# Patient Record
Sex: FEMALE | Race: WHITE | Employment: UNEMPLOYED | URBAN - METROPOLITAN AREA
[De-identification: names, ages, dates, MRNs, and addresses within clinical notes are randomized per-mention and may not be internally consistent; named-entity substitution may affect disease eponyms.]

---

## 2017-01-03 ENCOUNTER — OFFICE VISIT (OUTPATIENT)
Dept: FAMILY MEDICINE CLINIC | Age: 42
End: 2017-01-03

## 2017-01-03 VITALS
HEIGHT: 60 IN | OXYGEN SATURATION: 98 % | TEMPERATURE: 96.8 F | DIASTOLIC BLOOD PRESSURE: 90 MMHG | BODY MASS INDEX: 38.09 KG/M2 | SYSTOLIC BLOOD PRESSURE: 137 MMHG | WEIGHT: 194 LBS | HEART RATE: 105 BPM | RESPIRATION RATE: 16 BRPM

## 2017-01-03 DIAGNOSIS — G89.29 CHRONIC PAIN OF RIGHT KNEE: Primary | ICD-10-CM

## 2017-01-03 DIAGNOSIS — G89.29 CHRONIC PAIN OF LEFT KNEE: ICD-10-CM

## 2017-01-03 DIAGNOSIS — Z98.51 HISTORY OF BILATERAL TUBAL LIGATION: ICD-10-CM

## 2017-01-03 DIAGNOSIS — M25.561 CHRONIC PAIN OF RIGHT KNEE: Primary | ICD-10-CM

## 2017-01-03 DIAGNOSIS — M25.562 CHRONIC PAIN OF LEFT KNEE: ICD-10-CM

## 2017-01-03 RX ORDER — NAPROXEN 500 MG/1
500 TABLET ORAL
Qty: 30 TAB | Refills: 2 | Status: SHIPPED | OUTPATIENT
Start: 2017-01-03 | End: 2017-02-02 | Stop reason: ALTCHOICE

## 2017-01-03 NOTE — PROGRESS NOTES
Subjective:     Carito Wolf is a 37 y.o. female     3rd visit with this patient. Last one was over a year ago. She didn't do lab work or follow up until now. She have just been released from incarceration 2 weeks ago. LMP 12/15/2016    seen for evaluation and treatment of bilateral knee pain. This is evaluated as a personal injury. Right knee history of Menical and ACL surgery in 2013. Left knee pain went to ED Dec 2015 had X-ray and was normal then. Left knee slipped and landed on her knee while incarcerated in 04/2016. The pain is located medially. Symptoms improve with rest. Symptoms worsen with activity, running. The knee has not given out or felt unstable. The patient can bend and straighten the knee fully. Treatment to date has included NSAID's, without significant relief. Patients activity level: infrequently active in sports    Pertinent items are noted in HPI. Patient Active Problem List    Diagnosis Date Noted    History of bilateral tubal ligation 01/03/2017    [facility] 12/17/2015    Multiple personality disorder 12/17/2015    Schizophrenia ([facility] 12/17/2015    Anxiety and depression 11/25/2015    Essential hypertension 11/25/2015     Current Outpatient Prescriptions   Medication Sig Dispense Refill    naproxen (NAPROSYN) 500 mg tablet Take 1 Tab by mouth two (2) times daily as needed. 30 Tab 2    metoprolol succinate (TOPROL-XL) 100 mg XL tablet Take 1 Tab by mouth daily. 90 Tab 1    ALPRAZolam (XANAX) 1 mg tablet Take 1 Tab by mouth daily as needed for Anxiety. 30 Tab 0    HYDROcodone-acetaminophen (NORCO) 5-325 mg per tablet Take 1 Tab by mouth nightly as needed for Pain. Max Daily Amount: 1 Tab.  30 Tab 0     No Known Allergies  Past Surgical History   Procedure Laterality Date    Hx gyn       c section x3    Hx tubal ligation      Hx acl reconstruction Right         Objective:      General :   alert, cooperative, no distress, appears stated age   Gait: Normal. The patient can bear weight on the injured extremity. Left Lower Extremity  Hip Palpation:  no tenderness over the greater  trochanter   Hip ROM: 100% of normal    Knee Effusion:  0-1+   Ecchymosis:  none   Knee ROM:  0 to 130 degrees without subpatellar   crepitance. Patella:  Patella does track normally. Patellar apprehension test: negative  Patellar compression test: negative   Tenderness: lateral joint line   Stability:  Lachman's test: negative  Posterior drawer: negative  Medial collateral ligament: negative  Lateral collateral ligament: negative     Virgie's Test:  positive with no joint line tenderness   Sensation:   intact to light touch   Pulses: normal DP and PT pulses       Assessment:     Treatment options discussed including Medication options discussed and recommended. , Physical Therapy discussed and ordered, Activity modification discussed and recommended. Rod Stevens was seen today for knee pain. Diagnoses and all orders for this visit:    Chronic pain of right knee  -     MRI KNEE RT WO CONT; Future  -     naproxen (NAPROSYN) 500 mg tablet; Take 1 Tab by mouth two (2) times daily as needed. Chronic pain of left knee  -     XR KNEE LT MAX 2 VWS; Future  -     naproxen (NAPROSYN) 500 mg tablet; Take 1 Tab by mouth two (2) times daily as needed. History of bilateral tubal ligation      Follow-up Disposition:  Return in about 3 weeks (around 1/24/2017) for knees MRI and X-ray.       Mitzi Tolbert MD  1/9/2017

## 2017-01-03 NOTE — PROGRESS NOTES
Chief Complaint   Patient presents with    Knee Pain     and leg pain   has been in group home just got out recently and started back on meds.

## 2017-01-03 NOTE — MR AVS SNAPSHOT
Visit Information Date & Time Provider Department Dept. Phone Encounter #  
 1/3/2017 12:15 PM Lucila Tsai MD [facility] at [address] [place] [address] [id number] Follow-up Instructions Return in about 3 weeks (around 1/24/2017) for knees MRI and X-ray. Upcoming Health Maintenance Date Due DTaP/Tdap/Td series (1 - Tdap) 4/18/1997 PAP AKA CERVICAL CYTOLOGY 4/18/1997 INFLUENZA AGE 9 TO ADULT 8/1/2016 Allergies as of 1/3/2017  Review Complete On: 1/3/2017 By: Lucila Tsai MD  
 No Known Allergies Current Immunizations  Never Reviewed No immunizations on file. Not reviewed this visit You Were Diagnosed With   
  
 Codes Comments Chronic pain of right knee    -  Primary ICD-10-CM: M25.561, G37.36 ICD-9-CM: 719.46, 338.29 Chronic pain of left knee     ICD-10-CM: M25.562, G89.29 ICD-9-CM: 719.46, 338.29 Vitals BP Pulse Temp Resp Height(growth percentile) Weight(growth percentile) 137/90 (!) 105 96.8 °F (36 °C) (Oral) 16 5' (1.524 m) 194 lb (88 kg) LMP SpO2 BMI OB Status Smoking Status 12/15/2016 98% 37.89 kg/m2 Having regular periods Former Smoker Vitals History BMI and BSA Data Body Mass Index Body Surface Area  
 37.89 kg/m 2 1.93 m 2 Preferred Pharmacy [facility] Phone [facility] [address] [place] [address] [phone] Your Updated Medication List  
  
   
This list is accurate as of: 1/3/17 12:46 PM.  Always use your most recent med list.  
  
  
  
  
 ALPRAZolam 1 mg tablet Commonly known as:  Kaleta Yarelis Take 1 Tab by mouth daily as needed for Anxiety. HYDROcodone-acetaminophen 5-325 mg per tablet Commonly known as:  Eva Parisian Take 1 Tab by mouth nightly as needed for Pain. Max Daily Amount: 1 Tab. metoprolol succinate 100 mg tablet Commonly known as:  TOPROL-XL Take 1 Tab by mouth daily. naproxen 500 mg tablet Commonly known as:  NAPROSYN Take 1 Tab by mouth two (2) times daily as needed. Prescriptions Sent to Pharmacy Refills  
 naproxen (NAPROSYN) 500 mg tablet 2 Sig: Take 1 Tab by mouth two (2) times daily as needed. Class: Normal  
 Pharmacy: [address] [place] [address], [address] [place] [address] #: [phone] Route: Oral  
  
Follow-up Instructions Return in about 3 weeks (around 1/24/2017) for knees MRI and X-ray. To-Do List   
 01/03/2017 Imaging:  MRI KNEE RT WO CONT   
  
 01/03/2017 Imaging:  XR KNEE LT MAX 2 VWS Referral Information Referral ID Referred By Referred To  
  
 [id number] Isabella Hanks Not Available Visits Status Start Date End Date 1 New Request 1/3/17 1/3/18 If your referral has a status of pending review or denied, additional information will be sent to support the outcome of this decision. Introducing [facility]! Elsie Jean introduces ScaleIO patient portal. Now you can access parts of your medical record, email your doctor's office, and request medication refills online. 1. In your internet browser, go to [email or web address] 2. Click on the First Time User? Click Here link in the Sign In box. You will see the New Member Sign Up page. 3. Enter your ScaleIO Access Code exactly as it appears below. You will not need to use this code after youve completed the sign-up process. If you do not sign up before the expiration date, you must request a new code. · ScaleIO Access Code: [email or web address] Expires: 4/3/2017 12:46 PM 
 
4. Enter the last four digits of your Social Security Number (xxxx) and Date of Birth (mm/dd/yyyy) as indicated and click Submit. You will be taken to the next sign-up page. 5. Create a ScaleIO ID. This will be your ScaleIO login ID and cannot be changed, so think of one that is secure and easy to remember. 6. Create a Scandit password. You can change your password at any time. 7. Enter your Password Reset Question and Answer. This can be used at a later time if you forget your password. 8. Enter your e-mail address. You will receive e-mail notification when new information is available in [address] 9. Click Sign Up. You can now view and download portions of your medical record. 10. Click the Download Summary menu link to download a portable copy of your medical information. If you have questions, please visit the Frequently Asked Questions section of the Scandit website. Remember, Scandit is NOT to be used for urgent needs. For medical emergencies, dial 911. Now available from your iPhone and Android! Please provide this summary of care documentation to your next provider. Your primary care clinician is listed as Phys Other. If you have any questions after today's visit, please call [phone].

## 2017-01-18 ENCOUNTER — HOSPITAL ENCOUNTER (OUTPATIENT)
Dept: MRI IMAGING | Age: 42
Discharge: HOME | End: 2017-01-18
Attending: FAMILY MEDICINE
Payer: MEDICAID

## 2017-01-18 DIAGNOSIS — M25.561 CHRONIC PAIN OF RIGHT KNEE: ICD-10-CM

## 2017-01-18 DIAGNOSIS — G89.29 CHRONIC PAIN OF RIGHT KNEE: ICD-10-CM

## 2017-01-18 PROCEDURE — 73721 MRI JNT OF LWR EXTRE W/O DYE: CPT

## 2017-01-25 ENCOUNTER — TELEPHONE (OUTPATIENT)
Dept: FAMILY MEDICINE CLINIC | Age: 42
End: 2017-01-25

## 2017-01-25 DIAGNOSIS — G89.29 CHRONIC PAIN OF RIGHT KNEE: Primary | ICD-10-CM

## 2017-01-25 DIAGNOSIS — M25.561 CHRONIC PAIN OF RIGHT KNEE: Primary | ICD-10-CM

## 2017-02-02 ENCOUNTER — OFFICE VISIT (OUTPATIENT)
Dept: FAMILY MEDICINE CLINIC | Age: 42
End: 2017-02-02

## 2017-02-02 ENCOUNTER — HOSPITAL ENCOUNTER (OUTPATIENT)
Dept: LAB | Age: 42
Discharge: HOME | End: 2017-02-02
Payer: MEDICAID

## 2017-02-02 VITALS
TEMPERATURE: 96.6 F | HEART RATE: 76 BPM | SYSTOLIC BLOOD PRESSURE: 124 MMHG | OXYGEN SATURATION: 98 % | DIASTOLIC BLOOD PRESSURE: 96 MMHG | RESPIRATION RATE: 16 BRPM | HEIGHT: 60 IN | WEIGHT: 187 LBS | BODY MASS INDEX: 36.71 KG/M2

## 2017-02-02 DIAGNOSIS — F31.81 BIPOLAR 2 DISORDER (HCC): ICD-10-CM

## 2017-02-02 DIAGNOSIS — I10 ESSENTIAL HYPERTENSION: ICD-10-CM

## 2017-02-02 DIAGNOSIS — F41.9 ANXIETY AND DEPRESSION: ICD-10-CM

## 2017-02-02 DIAGNOSIS — F44.81 MULTIPLE PERSONALITY DISORDER (HCC): ICD-10-CM

## 2017-02-02 DIAGNOSIS — Z79.899 ENCOUNTER FOR LONG-TERM (CURRENT) USE OF MEDICATIONS: ICD-10-CM

## 2017-02-02 DIAGNOSIS — F32.A ANXIETY AND DEPRESSION: ICD-10-CM

## 2017-02-02 DIAGNOSIS — Z12.39 BREAST CANCER SCREENING: ICD-10-CM

## 2017-02-02 DIAGNOSIS — F20.89 OTHER SCHIZOPHRENIA (HCC): ICD-10-CM

## 2017-02-02 DIAGNOSIS — Z20.2 STD EXPOSURE: Primary | ICD-10-CM

## 2017-02-02 DIAGNOSIS — Z12.4 ENCOUNTER FOR SCREENING FOR CERVICAL CANCER: ICD-10-CM

## 2017-02-02 PROCEDURE — 88142 CYTOPATH C/V THIN LAYER: CPT | Performed by: FAMILY MEDICINE

## 2017-02-02 PROCEDURE — 87624 HPV HI-RISK TYP POOLED RSLT: CPT | Performed by: FAMILY MEDICINE

## 2017-02-02 RX ORDER — BUSPIRONE HYDROCHLORIDE 15 MG/1
15 TABLET ORAL
Qty: 60 TAB | Refills: 0 | Status: SHIPPED | OUTPATIENT
Start: 2017-02-02

## 2017-02-02 RX ORDER — TRAZODONE HYDROCHLORIDE 100 MG/1
100 TABLET ORAL
Qty: 30 TAB | Refills: 1 | Status: SHIPPED | OUTPATIENT
Start: 2017-02-02 | End: 2021-12-28 | Stop reason: SDUPTHER

## 2017-02-02 RX ORDER — LISINOPRIL 20 MG/1
20 TABLET ORAL DAILY
Qty: 30 TAB | Refills: 2 | Status: SHIPPED | OUTPATIENT
Start: 2017-02-02 | End: 2017-02-13 | Stop reason: SDUPTHER

## 2017-02-02 RX ORDER — ALPRAZOLAM 1 MG/1
1 TABLET ORAL
Qty: 30 TAB | Refills: 0 | Status: CANCELLED | OUTPATIENT
Start: 2017-02-02

## 2017-02-02 NOTE — PROGRESS NOTES
Patient Identification  Ciro Mcdonough is a 37 y.o. female. Chief Complaint   Medication Refill and Exposure to STD (wants to be tested for STD)    Denies abnormal vaginal discharge, bleeding or significant pelvic pain or fever. No UTI symptoms. Denies history of known exposure to STD. However she was recently incarcerated and have a new sexual partner. Patient's last menstrual period was 01/17/2017. HTN: add on lisinopril 20mg every day. Psych: PMHx of schizophrenia, bipolar, multiple personality disorder, anxiety and depression. She doesn't want to take lithium or valproate. Recently she's having hypomanic with hypermanic and anxiety. Denies SI or HI, denies visual or auditory hallucination. Discussed the complexity. We'll refer her to Psych. If any worsening symptoms to go to ED or f/u with us. Past Medical History   Diagnosis Date    Anxiety     Anxiety and depression 11/25/2015    Essential hypertension 11/25/2015    History of bilateral tubal ligation 1/3/2017    Hypertension     Personality disorder      Family History   Problem Relation Age of Onset    Stroke Mother    Scottyus Maxwell Bladder Disease Mother     Stroke Father     Heart Disease Brother      Current Outpatient Prescriptions   Medication Sig Dispense Refill    lisinopril (PRINIVIL, ZESTRIL) 20 mg tablet Take 1 Tab by mouth daily. 30 Tab 2    busPIRone (BUSPAR) 15 mg tablet Take 1 Tab by mouth two (2) times daily as needed. 60 Tab 0    Valproic Acid (DEPAKENE) 250 mg cpDR Take 250 mg by mouth two (2) times a day. 60 Cap 1    traZODone (DESYREL) 100 mg tablet Take 1 Tab by mouth nightly as needed for Sleep. 30 Tab 1    metoprolol succinate (TOPROL-XL) 100 mg XL tablet Take 1 Tab by mouth daily. 90 Tab 1    HYDROcodone-acetaminophen (NORCO) 5-325 mg per tablet Take 1 Tab by mouth nightly as needed for Pain. Max Daily Amount: 1 Tab.  30 Tab 0     No Known Allergies  Social History     Social History    Marital status: SINGLE     Spouse name: N/A    Number of children: N/A    Years of education: N/A     Occupational History    Not on file. Social History Main Topics    Smoking status: Former Smoker     Packs/day: 0.50     Years: 30.00     Quit date: 1/3/2014    Smokeless tobacco: Never Used    Alcohol use No    Drug use: Yes     Special: Cocaine    Sexual activity: Yes     Partners: Male     Birth control/ protection: Surgical     Other Topics Concern    Not on file     Social History Narrative     Review of Systems  A comprehensive review of systems was negative except for that written in the HPI. Physical Exam     Visit Vitals    BP (!) 124/96    Pulse 76    Temp 96.6 °F (35.9 °C) (Oral)    Resp 16    Ht 5' (1.524 m)    Wt 187 lb (84.8 kg)    LMP 01/17/2017    SpO2 98%    BMI 36.52 kg/m2     General:   alert, cooperative, no distress, appears stated age   Heart: regular rate and rhythm, S1, S2 normal, no murmur, click, rub or gallop   Lungs: clear to auscultation bilaterally   Abdomen: soft, non-tender, without masses or organomegaly   Pelvic:     Vulva: normal    Vagina:  normal mucosa    Cervix: multiparous appearance, have started her menstrual period    Uterus: Not palpable due to body habitus    Adnexa: Not palpable due to body habitus       Janak Curran was seen today for medication refill and exposure to std. Diagnoses and all orders for this visit:    STD exposure  -     Cancel: [facility]    Encounter for screening for cervical cancer   -     PAP, LB, RFX HPV TRL([id number]); Future  -     CA SCREEN;PELVIC/BREAST EXAM    Essential hypertension  -     lisinopril (PRINIVIL, ZESTRIL) 20 mg tablet; Take 1 Tab by mouth daily.  -     TSH RFX ON ABNORMAL TO FREE T4  -     METABOLIC PANEL, COMPREHENSIVE    Breast cancer screening  -     CA SCREEN;PELVIC/BREAST EXAM  -     EPIFANIO MAMMO BI SCREENING INCL CAD;  Future    Bipolar 2 disorder (HCC)  -     TSH RFX ON ABNORMAL TO FREE T4  -     METABOLIC PANEL, COMPREHENSIVE  -     Valproic Acid (DEPAKENE) 250 mg cpDR; Take 250 mg by mouth two (2) times a day. Anxiety and depression  -     REFERRAL TO PSYCHIATRY  -     TSH RFX ON ABNORMAL TO FREE T4  -     METABOLIC PANEL, COMPREHENSIVE  -     busPIRone (BUSPAR) 15 mg tablet; Take 1 Tab by mouth two (2) times daily as needed. -     Valproic Acid (DEPAKENE) 250 mg cpDR; Take 250 mg by mouth two (2) times a day. Other schizophrenia ([facility]  -     TSH RFX ON ABNORMAL TO FREE T4  -     METABOLIC PANEL, COMPREHENSIVE  -     Valproic Acid (DEPAKENE) 250 mg cpDR; Take 250 mg by mouth two (2) times a day. Multiple personality disorder  -     TSH RFX ON ABNORMAL TO FREE T4  -     METABOLIC PANEL, COMPREHENSIVE  -     Valproic Acid (DEPAKENE) 250 mg cpDR; Take 250 mg by mouth two (2) times a day. Encounter for long-term (current) use of medications  -     TSH RFX ON ABNORMAL TO FREE T4  -     METABOLIC PANEL, COMPREHENSIVE    Other orders  -     Cancel: ALPRAZolam (XANAX) 1 mg tablet; Take 1 Tab by mouth daily as needed for Anxiety. -     traZODone (DESYREL) 100 mg tablet; Take 1 Tab by mouth nightly as needed for Sleep.       Follow-up Disposition:  Return in about 2 months (around 4/2/2017) for annual exam.      Jose Phan MD  2/2/2017

## 2017-02-02 NOTE — MR AVS SNAPSHOT
Visit Information Date & Time Provider Department Dept. Phone Encounter #  
 2/2/2017  3:15 PM Carolee Reyes MD [facility] at [facility] [id number] Follow-up Instructions Return in about 2 months (around 4/2/2017) for annual exam.  
  
Your Appointments 2/2/2017  3:15 PM  
ROUTINE CARE with Carolee Reyes MD  
[facility] at [facility]) Appt Note: 3w fu; o/b r/s missed appt from 01/24/17; 3w fu    pt also wants to discuss medication,   getting tested for std [facility] [address] [id number] [facility] Upcoming Health Maintenance Date Due DTaP/Tdap/Td series (1 - Tdap) 4/18/1997 PAP AKA CERVICAL CYTOLOGY 4/18/1997 INFLUENZA AGE 9 TO ADULT 8/1/2016 Allergies as of 2/2/2017  Review Complete On: 1/3/2017 By: Carolee Reyes MD  
 No Known Allergies Current Immunizations  Never Reviewed No immunizations on file. Not reviewed this visit You Were Diagnosed With   
  
 Codes Comments STD exposure    -  Primary ICD-10-CM: Z20.2 ICD-9-CM: V01.6 Encounter for screening for cervical cancer      ICD-10-CM: Z12.4 ICD-9-CM: V76.2 Essential hypertension     ICD-10-CM: I10 
ICD-9-CM: 401.9 Breast cancer screening     ICD-10-CM: Z12.39 
ICD-9-CM: V76.10 Bipolar 2 disorder (HCC)     ICD-10-CM: F31.81 
ICD-9-CM: 296.89 Anxiety and depression     ICD-10-CM: F41.9, F32.9 ICD-9-CM: 300.00, 311 Other schizophrenia ([facility]     ICD-10-CM: F20.89 ICD-9-CM: 295.80 Multiple personality disorder     ICD-10-CM: F44.80 ICD-9-CM: 300.14 Encounter for long-term (current) use of medications     ICD-10-CM: Z79.899 ICD-9-CM: V58.69 Vitals BP Pulse Temp Resp Height(growth percentile) Weight(growth percentile) (!) 124/96 76 96.6 °F (35.9 °C) (Oral) 16 5' (1.524 m) 187 lb (84.8 kg) LMP SpO2 BMI OB Status Smoking Status 01/17/2017 98% 36.52 kg/m2 Having regular periods Former Smoker Vitals History BMI and BSA Data Body Mass Index Body Surface Area  
 36.52 kg/m 2 1.89 m 2 Preferred Pharmacy [facility] Phone [facility] [address] [facility] [address] [address] - Marianne Wright, [facility] [address] [place] [address] [phone] Your Updated Medication List  
  
   
This list is accurate as of: 2/2/17  3:10 PM.  Always use your most recent med list.  
  
  
  
  
 busPIRone 15 mg tablet Commonly known as:  BUSPAR Take 1 Tab by mouth two (2) times daily as needed. HYDROcodone-acetaminophen 5-325 mg per tablet Commonly known as:  Katy Punt Take 1 Tab by mouth nightly as needed for Pain. Max Daily Amount: 1 Tab. lisinopril 20 mg tablet Commonly known as:  Aundra Papito Take 1 Tab by mouth daily. metoprolol succinate 100 mg tablet Commonly known as:  TOPROL-XL Take 1 Tab by mouth daily. Valproic Acid 250 mg Cpdr  
Commonly known as:  Fabian Champagne Take 250 mg by mouth two (2) times a day. Prescriptions Printed Refills  
 busPIRone (BUSPAR) 15 mg tablet 0 Sig: Take 1 Tab by mouth two (2) times daily as needed. Class: Print Route: Oral  
  
Prescriptions Sent to Pharmacy Refills  
 lisinopril (PRINIVIL, ZESTRIL) 20 mg tablet 2 Sig: Take 1 Tab by mouth daily. Class: Normal  
 Pharmacy: [address] [place] [address] Ph #: [phone] Route: Oral  
 Valproic Acid (DEPAKENE) 250 mg cpDR 1 Sig: Take 250 mg by mouth two (2) times a day. Class: Normal  
 Pharmacy: [address] [place] [address] Ph #: [phone] Route: Oral  
  
We Performed the Following CA SCREEN;PELVIC/BREAST EXAM [ [facility]] METABOLIC PANEL, COMPREHENSIVE [35247 CPT(R)] [address] [id number] Custom] REFERRAL TO PSYCHIATRY [REF91 Custom] Comments:  
 Please evaluate patient for bipolar, schitzophrenia, personality disorder and depression anxiety. TSH RFX ON ABNORMAL TO FREE T4 [YYC201172 Custom] Follow-up Instructions Return in about 2 months (around 4/2/2017) for annual exam. To-Do List   
 02/02/2017 Imaging:  EPIFANIO MAMMO BI SCREENING INCL CAD   
  
 02/02/2017 Pathology:  PAP, LB, RFX HPV YDMMA([id number]) Referral Information Referral ID Referred By Referred To  
  
 [id number] Marco ROCHE 98, MD [address] Phone: [phone] Fax: [phone] Visits Status Start Date End Date 1 New Request 2/2/17 2/2/18 If your referral has a status of pending review or denied, additional information will be sent to support the outcome of this decision. Introducing [facility]! Aneta Marks introduces Pixalate patient portal. Now you can access parts of your medical record, email your doctor's office, and request medication refills online. 1. In your internet browser, go to [email or web address] 2. Click on the First Time User? Click Here link in the Sign In box. You will see the New Member Sign Up page. 3. Enter your Pixalate Access Code exactly as it appears below. You will not need to use this code after youve completed the sign-up process. If you do not sign up before the expiration date, you must request a new code. · Pixalate Access Code: [email or web address] Expires: 4/3/2017 12:46 PM 
 
4. Enter the last four digits of your Social Security Number (xxxx) and Date of Birth (mm/dd/yyyy) as indicated and click Submit. You will be taken to the next sign-up page. 5. Create a Pixalate ID. This will be your Pixalate login ID and cannot be changed, so think of one that is secure and easy to remember. 6. Create a FamilySkyline password. You can change your password at any time. 7. Enter your Password Reset Question and Answer. This can be used at a later time if you forget your password. 8. Enter your e-mail address. You will receive e-mail notification when new information is available in [address] 9. Click Sign Up. You can now view and download portions of your medical record. 10. Click the Download Summary menu link to download a portable copy of your medical information. If you have questions, please visit the Frequently Asked Questions section of the FamilySkyline website. Remember, FamilySkyline is NOT to be used for urgent needs. For medical emergencies, dial 911. Now available from your iPhone and Android! Please provide this summary of care documentation to your next provider. Your primary care clinician is listed as Reagan Hubbard. If you have any questions after today's visit, please call [phone].

## 2017-02-02 NOTE — PROGRESS NOTES
Chief Complaint   Patient presents with    Medication Refill     welbutrin and xanax    Exposure to STD     wants to be tested for STD

## 2017-02-03 LAB
ALBUMIN SERPL-MCNC: 4.4 G/DL (ref 3.5–5.5)
ALBUMIN/GLOB SERPL: 1.5 {RATIO} (ref 1.1–2.5)
ALP SERPL-CCNC: 91 IU/L (ref 39–117)
ALT SERPL-CCNC: 13 IU/L (ref 0–32)
AST SERPL-CCNC: 21 IU/L (ref 0–40)
BILIRUB SERPL-MCNC: 0.2 MG/DL (ref 0–1.2)
BUN SERPL-MCNC: 10 MG/DL (ref 6–24)
BUN/CREAT SERPL: 11 (ref 9–23)
CALCIUM SERPL-MCNC: 9.2 MG/DL (ref 8.7–10.2)
CHLORIDE SERPL-SCNC: 103 MMOL/L (ref 96–106)
CO2 SERPL-SCNC: 23 MMOL/L (ref 18–29)
CREAT SERPL-MCNC: 0.94 MG/DL (ref 0.57–1)
GLOBULIN SER CALC-MCNC: 3 G/DL (ref 1.5–4.5)
GLUCOSE SERPL-MCNC: 86 MG/DL (ref 65–99)
POTASSIUM SERPL-SCNC: 4.4 MMOL/L (ref 3.5–5.2)
PROT SERPL-MCNC: 7.4 G/DL (ref 6–8.5)
SODIUM SERPL-SCNC: 142 MMOL/L (ref 134–144)
TSH SERPL DL<=0.005 MIU/L-ACNC: 1.47 UIU/ML (ref 0.45–4.5)

## 2017-02-07 LAB
A VAGINAE DNA VAG QL NAA+PROBE: ABNORMAL SCORE
BVAB2 DNA VAG QL NAA+PROBE: ABNORMAL SCORE
C ALBICANS DNA VAG QL NAA+PROBE: NEGATIVE
C GLABRATA DNA VAG QL NAA+PROBE: NEGATIVE
C TRACH RRNA SPEC QL NAA+PROBE: POSITIVE
MEGA1 DNA VAG QL NAA+PROBE: ABNORMAL SCORE
N GONORRHOEA RRNA SPEC QL NAA+PROBE: NEGATIVE
T VAGINALIS RRNA SPEC QL NAA+PROBE: NEGATIVE

## 2017-02-08 DIAGNOSIS — B96.89 BV (BACTERIAL VAGINOSIS): ICD-10-CM

## 2017-02-08 DIAGNOSIS — N76.0 BV (BACTERIAL VAGINOSIS): ICD-10-CM

## 2017-02-08 DIAGNOSIS — A74.9 CHLAMYDIA INFECTION: Primary | ICD-10-CM

## 2017-02-08 RX ORDER — AZITHROMYCIN 500 MG/1
1000 TABLET, FILM COATED ORAL ONCE
Qty: 2 TAB | Refills: 0 | Status: SHIPPED | OUTPATIENT
Start: 2017-02-08 | End: 2017-02-08

## 2017-02-08 RX ORDER — METRONIDAZOLE 500 MG/1
500 TABLET ORAL 2 TIMES DAILY
Qty: 14 TAB | Refills: 0 | Status: SHIPPED | OUTPATIENT
Start: 2017-02-08 | End: 2017-02-15

## 2017-02-08 NOTE — PROGRESS NOTES
Labs reviewed with patient over the phone. Inform of STD. To have partner treated. Refrain from sexual activities until treatment complete. Results for orders placed or performed in visit on 02/02/17   TSH RFX ON ABNORMAL TO FREE T4   Result Value Ref Range    TSH 1.470 0.450 - 6.699 uIU/mL   METABOLIC PANEL, COMPREHENSIVE   Result Value Ref Range    Glucose 86 65 - 99 mg/dL    BUN 10 6 - 24 mg/dL    Creatinine 0.94 0.57 - 1.00 mg/dL    GFR est non-AA 76 >59 mL/min/1.73    GFR est AA 88 >59 mL/min/1.73    BUN/Creatinine ratio 11 9 - 23    Sodium 142 134 - 144 mmol/L    Potassium 4.4 3.5 - 5.2 mmol/L    Chloride 103 96 - 106 mmol/L    CO2 23 18 - 29 mmol/L    Calcium 9.2 8.7 - 10.2 mg/dL    Protein, total 7.4 6.0 - 8.5 g/dL    Albumin 4.4 3.5 - 5.5 g/dL    GLOBULIN, TOTAL 3.0 1.5 - 4.5 g/dL    A-G Ratio 1.5 1.1 - 2.5    Bilirubin, total 0.2 0.0 - 1.2 mg/dL    Alk. phosphatase 91 39 - 117 IU/L    AST (SGOT) 21 0 - 40 IU/L    ALT (SGPT) 13 0 - 32 IU/L   NUSWAB VAGINITIS PLUS   Result Value Ref Range    Atopobium vaginae High - 2 (A) Score    BVAB 2 High - 2 (A) Score    Megasphaera 1 Low - 0 Score    C. albicans, CHACHO Negative Negative    C. glabrata, CHACHO Negative Negative    T. vaginalis, CHACHO Negative Negative    C. trachomatis, CHACHO Positive (A) Negative    N. gonorrhoeae, CHACHO Negative Negative     Diagnoses and all orders for this visit:    Chlamydia infection  -     azithromycin (ZITHROMAX) 500 mg tab; Take 2 Tabs by mouth once for 1 dose. BV (bacterial vaginosis)  -     metroNIDAZOLE (FLAGYL) 500 mg tablet; Take 1 Tab by mouth two (2) times a day for 7 days.       Lou Lindo MD  2/8/2017

## 2017-02-13 ENCOUNTER — OFFICE VISIT (OUTPATIENT)
Dept: FAMILY MEDICINE CLINIC | Age: 42
End: 2017-02-13

## 2017-02-13 VITALS
OXYGEN SATURATION: 96 % | BODY MASS INDEX: 37.42 KG/M2 | RESPIRATION RATE: 16 BRPM | HEIGHT: 60 IN | HEART RATE: 94 BPM | TEMPERATURE: 97 F | WEIGHT: 190.6 LBS | DIASTOLIC BLOOD PRESSURE: 109 MMHG | SYSTOLIC BLOOD PRESSURE: 151 MMHG

## 2017-02-13 DIAGNOSIS — A74.9 CHLAMYDIA INFECTION: Primary | ICD-10-CM

## 2017-02-13 DIAGNOSIS — Z20.2 GONORRHEA CONTACT: ICD-10-CM

## 2017-02-13 DIAGNOSIS — I10 ESSENTIAL HYPERTENSION: ICD-10-CM

## 2017-02-13 RX ORDER — LISINOPRIL 20 MG/1
20 TABLET ORAL DAILY
Qty: 30 TAB | Refills: 2 | Status: SHIPPED | OUTPATIENT
Start: 2017-02-13 | End: 2021-08-25 | Stop reason: ALTCHOICE

## 2017-02-13 RX ORDER — CEFTRIAXONE 500 MG/1
500 INJECTION, POWDER, FOR SOLUTION INTRAMUSCULAR; INTRAVENOUS ONCE
Qty: 500 MG | Refills: 0
Start: 2017-02-13 | End: 2017-02-13

## 2017-02-13 RX ORDER — AZITHROMYCIN 500 MG/1
1000 TABLET, FILM COATED ORAL
Qty: 2 TAB | Refills: 0 | Status: SHIPPED | OUTPATIENT
Start: 2017-02-13 | End: 2017-02-13

## 2017-02-13 NOTE — PROGRESS NOTES
Chief Complaint   Patient presents with    Vaginal Discharge   Had a problem with her  Insurance, was not able to get her meds but now Insurance is fixed. Discharge now is yellow, has stong urine odor. Bp elevated x 2 readings. C/o dizziness.

## 2017-02-13 NOTE — MR AVS SNAPSHOT
Visit Information Date & Time Provider Department Dept. Phone Encounter #  
 2/13/2017 11:30 AM Carolee Reyes MD [facility] at [address] [place] [address] [id number] Follow-up Instructions Return in about 1 week (around 2/20/2017), or if symptoms worsen or fail to improve. Your Appointments 4/6/2017  2:30 PM  
COMPLETE PHYSICAL with Carolee Reyes MD  
[facility] Appt Note: cpe    2m fu  annual exam  
 [address] [id number]  
[address] [facility] [address] Upcoming Health Maintenance Date Due DTaP/Tdap/Td series (1 - Tdap) 4/18/1997 INFLUENZA AGE 9 TO ADULT 8/1/2016 PAP AKA CERVICAL CYTOLOGY 2/2/2020 Allergies as of 2/13/2017  Review Complete On: 2/2/2017 By: Carolee Reyes MD  
 No Known Allergies Current Immunizations  Never Reviewed No immunizations on file. Not reviewed this visit You Were Diagnosed With   
  
 Codes Comments Chlamydia infection    -  Primary ICD-10-CM: A74.9 ICD-9-CM: 079.98 Gonorrhea contact     ICD-10-CM: Z20.2 ICD-9-CM: V01.6 Essential hypertension     ICD-10-CM: I10 
ICD-9-CM: 401.9 Vitals BP Pulse Temp Resp Height(growth percentile) Weight(growth percentile) (!) 151/109 94 97 °F (36.1 °C) (Oral) 16 5' (1.524 m) 190 lb 9.6 oz (86.5 kg) LMP SpO2 BMI OB Status Smoking Status 01/17/2017 (!) 16% 37.22 kg/m2 Having regular periods Former Smoker Vitals History BMI and BSA Data Body Mass Index Body Surface Area  
 37.22 kg/m 2 1.91 m 2 Preferred Pharmacy Pharmacy Name Phone [facility] [place] Main [address] [place] [address] [phone] Your Updated Medication List  
  
   
This list is accurate as of: 2/13/17 12:06 PM.  Always use your most recent med list.  
  
  
  
  
 azithromycin 500 mg Tab Commonly known as:  Unice Messing Take 2 Tabs by mouth now for 1 dose. busPIRone 15 mg tablet Commonly known as:  BUSPAR Take 1 Tab by mouth two (2) times daily as needed. cefTRIAXone 500 mg injection Commonly known as:  ROCEPHIN  
500 mg by IntraMUSCular route once for 1 dose. HYDROcodone-acetaminophen 5-325 mg per tablet Commonly known as:  Yaneli Petri Take 1 Tab by mouth nightly as needed for Pain. Max Daily Amount: 1 Tab. lisinopril 20 mg tablet Commonly known as:  Cabral Eve Take 1 Tab by mouth daily. metoprolol succinate 100 mg tablet Commonly known as:  TOPROL-XL Take 1 Tab by mouth daily. metroNIDAZOLE 500 mg tablet Commonly known as:  FLAGYL Take 1 Tab by mouth two (2) times a day for 7 days. traZODone 100 mg tablet Commonly known as:  Rebbecca Bunde Take 1 Tab by mouth nightly as needed for Sleep. Valproic Acid 250 mg Cpdr  
Commonly known as:  Maria De Jesus [place] Take 250 mg by mouth two (2) times a day. Prescriptions Sent to Pharmacy Refills  
 lisinopril (PRINIVIL, ZESTRIL) 20 mg tablet 2 Sig: Take 1 Tab by mouth daily. Class: Normal  
 Pharmacy: [address] [place] [address] Ph #: [phone] Route: Oral  
 azithromycin (ZITHROMAX) 500 mg tab 0 Sig: Take 2 Tabs by mouth now for 1 dose. Class: Normal  
 Pharmacy: [address] [place] [address] Ph #: [phone] Route: Oral  
  
We Performed the Following CEFTRIAXONE SODIUM INJECTION  MG [ [facility]] Comments:  
 Mix per protocol DC THER/PROPH/DIAG INJECTION, SUBCUT/IM [id number] CPT(R)] Follow-up Instructions Return in about 1 week (around 2/20/2017), or if symptoms worsen or fail to improve. Introducing [facility] HE[facility]!    
 Clay Majano introduces [employer] patient portal. Now you can access parts of your medical record, email your doctor's office, and request medication refills online. 1. In your internet browser, go to [email or web address] 2. Click on the First Time User? Click Here link in the Sign In box. You will see the New Member Sign Up page. 3. Enter your Sun-eee Access Code exactly as it appears below. You will not need to use this code after youve completed the sign-up process. If you do not sign up before the expiration date, you must request a new code. · Sun-eee Access Code: [email or web address] Expires: 4/3/2017 12:46 PM 
 
4. Enter the last four digits of your Social Security Number (xxxx) and Date of Birth (mm/dd/yyyy) as indicated and click Submit. You will be taken to the next sign-up page. 5. Create a Sun-eee ID. This will be your Sun-eee login ID and cannot be changed, so think of one that is secure and easy to remember. 6. Create a Sun-eee password. You can change your password at any time. 7. Enter your Password Reset Question and Answer. This can be used at a later time if you forget your password. 8. Enter your e-mail address. You will receive e-mail notification when new information is available in [address] 9. Click Sign Up. You can now view and download portions of your medical record. 10. Click the Download Summary menu link to download a portable copy of your medical information. If you have questions, please visit the Frequently Asked Questions section of the Sun-eee website. Remember, Sun-eee is NOT to be used for urgent needs. For medical emergencies, dial 911. Now available from your iPhone and Android! Please provide this summary of care documentation to your next provider. Your primary care clinician is listed as Jolie Estevez. If you have any questions after today's visit, please call [phone].

## 2017-02-13 NOTE — PROGRESS NOTES
Tammi Nevarez is a 37 y.o. female    Positive for BV and Chlamydia. However her insurance didn't pay for the Azithromycin so she didn't take that. They only paid for Flagyl which she took a day after. She is on her 5th days. Is experiencing diarrhea 24hrs after starting flagyl. Given that it's 2 more days she's to finish the course. Her boyfriend recently was treated for Gonorrhea. They had sex while she's not treated and was using the condom, but that came off. Now she's potentially have both gonorrhea and chlamydia. He needed to be retreated. She denies fever or chills. But does have yellowish vaginal discharge. HTN: she didn't realize she was on both lisinopril and metoprolol, she's only taking the metoprolol and stopped Lisinopril. Reviewed: active problem list, medication list, allergies, notes from last encounter, lab results    Pertinent items are noted in HPI. Results for orders placed or performed in visit on 02/02/17   TSH RFX ON ABNORMAL TO FREE T4   Result Value Ref Range    TSH 1.470 0.450 - 6.860 uIU/mL   METABOLIC PANEL, COMPREHENSIVE   Result Value Ref Range    Glucose 86 65 - 99 mg/dL    BUN 10 6 - 24 mg/dL    Creatinine 0.94 0.57 - 1.00 mg/dL    GFR est non-AA 76 >59 mL/min/1.73    GFR est AA 88 >59 mL/min/1.73    BUN/Creatinine ratio 11 9 - 23    Sodium 142 134 - 144 mmol/L    Potassium 4.4 3.5 - 5.2 mmol/L    Chloride 103 96 - 106 mmol/L    CO2 23 18 - 29 mmol/L    Calcium 9.2 8.7 - 10.2 mg/dL    Protein, total 7.4 6.0 - 8.5 g/dL    Albumin 4.4 3.5 - 5.5 g/dL    GLOBULIN, TOTAL 3.0 1.5 - 4.5 g/dL    A-G Ratio 1.5 1.1 - 2.5    Bilirubin, total 0.2 0.0 - 1.2 mg/dL    Alk.  phosphatase 91 39 - 117 IU/L    AST (SGOT) 21 0 - 40 IU/L    ALT (SGPT) 13 0 - 32 IU/L   NUSWAB VAGINITIS PLUS   Result Value Ref Range    Atopobium vaginae High - 2 (A) Score    BVAB 2 High - 2 (A) Score    Megasphaera 1 Low - 0 Score    C. albicans, CHACHO Negative Negative    C. glabrata, CHACHO Negative Negative    T. vaginalis, CHACHO Negative Negative    C. trachomatis, CHACHO Positive (A) Negative    N. gonorrhoeae, CHACHO Negative Negative       No Known Allergies  Current Outpatient Prescriptions on File Prior to Visit   Medication Sig Dispense Refill    metroNIDAZOLE (FLAGYL) 500 mg tablet Take 1 Tab by mouth two (2) times a day for 7 days. 14 Tab 0    metoprolol succinate (TOPROL-XL) 100 mg XL tablet Take 1 Tab by mouth daily. 90 Tab 1    busPIRone (BUSPAR) 15 mg tablet Take 1 Tab by mouth two (2) times daily as needed. 60 Tab 0    Valproic Acid (DEPAKENE) 250 mg cpDR Take 250 mg by mouth two (2) times a day. 60 Cap 1    traZODone (DESYREL) 100 mg tablet Take 1 Tab by mouth nightly as needed for Sleep. 30 Tab 1    HYDROcodone-acetaminophen (NORCO) 5-325 mg per tablet Take 1 Tab by mouth nightly as needed for Pain. Max Daily Amount: 1 Tab. 30 Tab 0     No current facility-administered medications on file prior to visit. 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 Normal without focal findings. Cranial nerves 2-12 intact. Normal coordination and gait. Mental status: Alert, oriented, thought content appropriate, affect: stable, mood-congruent. Head: Normocephalic, without obvious abnormality, atraumatic  Eyes: conjunctivae/corneas clear. PERRL, EOM's intact.    Lungs: clear to auscultation bilaterally  Heart: regular rate and rhythm, S1, S2 normal, no murmur, click, rub or gallop  Abdomen: soft, no guarding or rebound. Mild tenderness on deep palpation of lower left pelvic. GYN: Given our recent GYN and papsmear result will not reexamine and treat today. Extremities: extremities normal, atraumatic, no cyanosis or edema      Assessment/Plans:    Kristene Goodpasture was seen today for vaginal discharge. Diagnoses and all orders for this visit:    Chlamydia infection  -     cefTRIAXone (ROCEPHIN) 500 mg injection; 500 mg by IntraMUSCular route once for 1 dose. -     CEFTRIAXONE SODIUM INJECTION  MG (Qty 2 for 500 mg)  -     THER/PROPH/DIAG INJECTION, SUBCUT/IM  -     azithromycin (ZITHROMAX) 500 mg tab; Take 2 Tabs by mouth now for 1 dose. Gonorrhea contact  -     cefTRIAXone (ROCEPHIN) 500 mg injection; 500 mg by IntraMUSCular route once for 1 dose. -     CEFTRIAXONE SODIUM INJECTION  MG (Qty 2 for 500 mg)  -     THER/PROPH/DIAG INJECTION, SUBCUT/IM  -     azithromycin (ZITHROMAX) 500 mg tab; Take 2 Tabs by mouth now for 1 dose. Essential hypertension  -     lisinopril (PRINIVIL, ZESTRIL) 20 mg tablet; Take 1 Tab by mouth daily. Discussed plans, risk/benefits of treatments/observations. Through the use of shared decision making, above plans were agreed upon. Medication compliance advised. Patient verbalized understanding. Follow-up Disposition:  Return in about 1 week (around 2/20/2017), or if symptoms worsen or fail to improve.       Angella Barrera MD  2/13/2017

## 2017-02-28 DIAGNOSIS — K64.8 OTHER HEMORRHOIDS: Primary | ICD-10-CM

## 2017-03-03 ENCOUNTER — HOSPITAL ENCOUNTER (OUTPATIENT)
Dept: MRI IMAGING | Age: 42
Discharge: HOME | End: 2017-03-03
Attending: ORTHOPAEDIC SURGERY
Payer: MEDICAID

## 2017-03-03 DIAGNOSIS — S83.512A RUPTURE OF ANTERIOR CRUCIATE LIGAMENT OF LEFT KNEE: ICD-10-CM

## 2017-03-03 PROCEDURE — 73721 MRI JNT OF LWR EXTRE W/O DYE: CPT

## 2017-03-08 PROBLEM — S83.512A RUPTURE OF ANTERIOR CRUCIATE LIGAMENT OF LEFT KNEE: Status: ACTIVE | Noted: 2017-03-08

## 2017-03-09 RX ORDER — ACETAMINOPHEN AND CODEINE PHOSPHATE 300; 30 MG/1; MG/1
1 TABLET ORAL
COMMUNITY
End: 2017-03-13

## 2017-03-09 NOTE — PERIOP NOTES
[facility]  Ambulatory Surgery Unit  Pre-operative Instructions    Surgery/Procedure Date  03/13/2017            Tentative Arrival Time 1200      1. On the day of your surgery/procedure, please report to the Ambulatory Surgery Unit Registration Desk and sign in at your designated time. The Ambulatory Surgery Unit is located in [facility] on the [facility] side of the [facility] across from the [address] [place] [address]. Please have all of your health insurance cards and a photo ID. 2. You must have someone with you to drive you home, as you should not drive a car for 24 hours following anesthesia. Please make arrangements for a responsible adult friend or family member to stay with you for at least the first 24 hours after your surgery. 3. Do not have anything to eat or drink (including water, gum, mints, coffee, juice) after midnight   03/12/2017  . This may not apply to medications prescribed by your physician. (Please note below the special instructions with medications to take the morning of surgery, if applicable.)    4. We recommend you do not drink any alcoholic beverages for 24 hours before and after your surgery. 5. Stop all Aspirin and non-steroidal anti-inflammatory drugs (i.e. Advil, Aleve) as directed by your surgeon's office. Stop all vitamins and herbal supplements seven days prior to your surgery. If you are currently taking Plavix, Coumadin, or other blood-thinning agents, contact your surgeon for instructions. 6. In an effort to help prevent surgical site infection, we ask that you shower with an anti-bacterial soap (i.e. Dial or Safeguard) for 3 days prior to and on the morning of surgery, using a fresh towel after each shower. Do not apply any lotions, powders or deodorants after the shower on the day of your procedure. If applicable, please do not shave the operative site for 48 hours prior to surgery. 7. Wear comfortable clothes.   Wear glasses instead of contacts. Do not bring any money or jewelry. Do not wear make-up, particularly mascara, the morning of your surgery. Do not wear nail polish, particularly if you are having foot /hand surgery. Wear your hair loose or down, no ponytails, buns, heike pins or clips. All body piercings must be removed. 8. You should understand that if you do not follow these instructions your surgery may be cancelled. If your physical condition changes (i.e. fever, cold or flu) please contact your surgeon as soon as possible. 9. It is important that you be on time. If a situation occurs where you may be late, or if you have any questions or problems, please call [phone].    10. Your surgery time may be subject to change. You will receive a phone call the day prior to surgery to confirm your arrival time. 11. Pediatric patients: please bring a change of clothes, diapers, bottle/sippy cup, pacifier, etc.      Special Instructions:    MEDICATIONS TO TAKE THE MORNING OF SURGERY WITH A SIP OF WATER: Buspar, Metoprolol      I understand a pre-operative phone call will be made to verify my surgery time. In the event that I am not available, I give permission for a message to be left on my answering service and/or with another person? yes         ___________________      ___________________  _________  Pt verbalized understanding of preop instructions via telephone.   (Signature of Patient)          (Witness)                              (Date and Time)

## 2017-03-10 ENCOUNTER — ANESTHESIA EVENT (OUTPATIENT)
Dept: SURGERY | Age: 42
End: 2017-03-10
Payer: MEDICAID

## 2017-03-13 ENCOUNTER — HOSPITAL ENCOUNTER (OUTPATIENT)
Age: 42
Setting detail: OUTPATIENT SURGERY
Discharge: HOME | End: 2017-03-13
Attending: ORTHOPAEDIC SURGERY | Admitting: ORTHOPAEDIC SURGERY
Payer: MEDICAID

## 2017-03-13 ENCOUNTER — SURGERY (OUTPATIENT)
Age: 42
End: 2017-03-13

## 2017-03-13 ENCOUNTER — ANESTHESIA (OUTPATIENT)
Dept: SURGERY | Age: 42
End: 2017-03-13
Payer: MEDICAID

## 2017-03-13 VITALS
DIASTOLIC BLOOD PRESSURE: 88 MMHG | HEART RATE: 79 BPM | HEIGHT: 60 IN | TEMPERATURE: 97.7 F | BODY MASS INDEX: 36.32 KG/M2 | OXYGEN SATURATION: 96 % | SYSTOLIC BLOOD PRESSURE: 138 MMHG | WEIGHT: 185 LBS | RESPIRATION RATE: 16 BRPM

## 2017-03-13 LAB — HCG UR QL: NEGATIVE

## 2017-03-13 PROCEDURE — 74011250637 HC RX REV CODE- 250/637

## 2017-03-13 PROCEDURE — 74011250636 HC RX REV CODE- 250/636

## 2017-03-13 PROCEDURE — 74011250636 HC RX REV CODE- 250/636: Performed by: ANESTHESIOLOGY

## 2017-03-13 PROCEDURE — 77030002933 HC SUT MCRYL J&J -A: Performed by: ORTHOPAEDIC SURGERY

## 2017-03-13 PROCEDURE — 77030008496 HC TBNG ARTHSC IRR S&N -B: Performed by: ORTHOPAEDIC SURGERY

## 2017-03-13 PROCEDURE — 77030020274 HC MISC IMPL ORTHOPEDIC: Performed by: ORTHOPAEDIC SURGERY

## 2017-03-13 PROCEDURE — 76210000050 HC AMBSU PH II REC 0.5 TO 1 HR: Performed by: ORTHOPAEDIC SURGERY

## 2017-03-13 PROCEDURE — 81025 URINE PREGNANCY TEST: CPT

## 2017-03-13 PROCEDURE — 76030000020 HC AMB SURG 1.5 TO 2 HR INTENSV-TIER 1: Performed by: ORTHOPAEDIC SURGERY

## 2017-03-13 PROCEDURE — 77030010509 HC AIRWY LMA MSK TELE -A: Performed by: NURSE ANESTHETIST, CERTIFIED REGISTERED

## 2017-03-13 PROCEDURE — C1762 CONN TISS, HUMAN(INC FASCIA): HCPCS | Performed by: ORTHOPAEDIC SURGERY

## 2017-03-13 PROCEDURE — 77030028224 HC PDNG CST BSNM -A: Performed by: ORTHOPAEDIC SURGERY

## 2017-03-13 PROCEDURE — 77030002916 HC SUT ETHLN J&J -A: Performed by: ORTHOPAEDIC SURGERY

## 2017-03-13 PROCEDURE — 77030031139 HC SUT VCRL2 J&J -A: Performed by: ORTHOPAEDIC SURGERY

## 2017-03-13 PROCEDURE — 76060000063 HC AMB SURG ANES 1.5 TO 2 HR: Performed by: ORTHOPAEDIC SURGERY

## 2017-03-13 PROCEDURE — 77030002922 HC SUT FBRWRE ARTH -B: Performed by: ORTHOPAEDIC SURGERY

## 2017-03-13 PROCEDURE — 77030037795 HC GRFT TISS FLXGRFT ALLGRFT FZ LIFV -H: Performed by: ORTHOPAEDIC SURGERY

## 2017-03-13 PROCEDURE — 77030011640 HC PAD GRND REM COVD -A: Performed by: ORTHOPAEDIC SURGERY

## 2017-03-13 PROCEDURE — 77030006884 HC BLD SHV INCIS S&N -B: Performed by: ORTHOPAEDIC SURGERY

## 2017-03-13 PROCEDURE — 74011000250 HC RX REV CODE- 250

## 2017-03-13 PROCEDURE — 77030018835 HC SOL IRR LR ICUM -A: Performed by: ORTHOPAEDIC SURGERY

## 2017-03-13 PROCEDURE — 77030020753 HC CUF TRNQT 1BLA STRY -B: Performed by: ORTHOPAEDIC SURGERY

## 2017-03-13 PROCEDURE — 77030034499 HC CUTR BN ENDOSC FLIPCUTR ARTH -F: Performed by: ORTHOPAEDIC SURGERY

## 2017-03-13 PROCEDURE — 77030020268 HC MISC GENERAL SUPPLY: Performed by: ORTHOPAEDIC SURGERY

## 2017-03-13 PROCEDURE — 77030029200 HC SUT PASS WRE ARTH -B: Performed by: ORTHOPAEDIC SURGERY

## 2017-03-13 PROCEDURE — 77030013079 HC BLNKT BAIR HGGR 3M -A: Performed by: NURSE ANESTHETIST, CERTIFIED REGISTERED

## 2017-03-13 PROCEDURE — 76210000035 HC AMBSU PH I REC 1 TO 1.5 HR: Performed by: ORTHOPAEDIC SURGERY

## 2017-03-13 PROCEDURE — 74011250636 HC RX REV CODE- 250/636: Performed by: ORTHOPAEDIC SURGERY

## 2017-03-13 DEVICE — GRAFT HUM TISS L60-80MM DIA7.5-10.5MM FRZN FLEXIGRFT: Type: IMPLANTABLE DEVICE | Site: KNEE | Status: FUNCTIONAL

## 2017-03-13 RX ORDER — SODIUM CHLORIDE 0.9 % (FLUSH) 0.9 %
5-10 SYRINGE (ML) INJECTION AS NEEDED
Status: DISCONTINUED | OUTPATIENT
Start: 2017-03-13 | End: 2017-03-13 | Stop reason: HOSPADM

## 2017-03-13 RX ORDER — OXYCODONE AND ACETAMINOPHEN 5; 325 MG/1; MG/1
1 TABLET ORAL ONCE
Status: DISCONTINUED | OUTPATIENT
Start: 2017-03-13 | End: 2017-03-13 | Stop reason: HOSPADM

## 2017-03-13 RX ORDER — KETOROLAC TROMETHAMINE 30 MG/ML
30 INJECTION, SOLUTION INTRAMUSCULAR; INTRAVENOUS
Status: DISCONTINUED | OUTPATIENT
Start: 2017-03-13 | End: 2017-03-13 | Stop reason: HOSPADM

## 2017-03-13 RX ORDER — SODIUM CHLORIDE, SODIUM LACTATE, POTASSIUM CHLORIDE, CALCIUM CHLORIDE 600; 310; 30; 20 MG/100ML; MG/100ML; MG/100ML; MG/100ML
25 INJECTION, SOLUTION INTRAVENOUS CONTINUOUS
Status: DISCONTINUED | OUTPATIENT
Start: 2017-03-13 | End: 2017-03-13 | Stop reason: HOSPADM

## 2017-03-13 RX ORDER — PROPOFOL 10 MG/ML
INJECTION, EMULSION INTRAVENOUS AS NEEDED
Status: DISCONTINUED | OUTPATIENT
Start: 2017-03-13 | End: 2017-03-13 | Stop reason: HOSPADM

## 2017-03-13 RX ORDER — FENTANYL CITRATE 50 UG/ML
25 INJECTION, SOLUTION INTRAMUSCULAR; INTRAVENOUS
Status: COMPLETED | OUTPATIENT
Start: 2017-03-13 | End: 2017-03-13

## 2017-03-13 RX ORDER — LIDOCAINE HYDROCHLORIDE 20 MG/ML
INJECTION, SOLUTION INFILTRATION; PERINEURAL
Status: DISCONTINUED
Start: 2017-03-13 | End: 2017-03-13 | Stop reason: HOSPADM

## 2017-03-13 RX ORDER — FENTANYL CITRATE 50 UG/ML
INJECTION, SOLUTION INTRAMUSCULAR; INTRAVENOUS AS NEEDED
Status: DISCONTINUED | OUTPATIENT
Start: 2017-03-13 | End: 2017-03-13 | Stop reason: HOSPADM

## 2017-03-13 RX ORDER — SODIUM CHLORIDE 0.9 % (FLUSH) 0.9 %
5-10 SYRINGE (ML) INJECTION EVERY 8 HOURS
Status: DISCONTINUED | OUTPATIENT
Start: 2017-03-13 | End: 2017-03-13 | Stop reason: HOSPADM

## 2017-03-13 RX ORDER — LIDOCAINE HYDROCHLORIDE 20 MG/ML
INJECTION, SOLUTION EPIDURAL; INFILTRATION; INTRACAUDAL; PERINEURAL AS NEEDED
Status: DISCONTINUED | OUTPATIENT
Start: 2017-03-13 | End: 2017-03-13 | Stop reason: HOSPADM

## 2017-03-13 RX ORDER — MIDAZOLAM HYDROCHLORIDE 1 MG/ML
INJECTION, SOLUTION INTRAMUSCULAR; INTRAVENOUS
Status: DISCONTINUED
Start: 2017-03-13 | End: 2017-03-13 | Stop reason: HOSPADM

## 2017-03-13 RX ORDER — HYDROMORPHONE HYDROCHLORIDE 2 MG/1
TABLET ORAL
Status: COMPLETED
Start: 2017-03-13 | End: 2017-03-13

## 2017-03-13 RX ORDER — MORPHINE SULFATE 15 MG/1
15 TABLET, FILM COATED, EXTENDED RELEASE ORAL EVERY 12 HOURS
Qty: 10 TAB | Refills: 0 | Status: SHIPPED | OUTPATIENT
Start: 2017-03-13 | End: 2021-12-28 | Stop reason: ALTCHOICE

## 2017-03-13 RX ORDER — MIDAZOLAM HYDROCHLORIDE 1 MG/ML
INJECTION, SOLUTION INTRAMUSCULAR; INTRAVENOUS AS NEEDED
Status: DISCONTINUED | OUTPATIENT
Start: 2017-03-13 | End: 2017-03-13 | Stop reason: HOSPADM

## 2017-03-13 RX ORDER — DEXAMETHASONE SODIUM PHOSPHATE 4 MG/ML
INJECTION, SOLUTION INTRA-ARTICULAR; INTRALESIONAL; INTRAMUSCULAR; INTRAVENOUS; SOFT TISSUE AS NEEDED
Status: DISCONTINUED | OUTPATIENT
Start: 2017-03-13 | End: 2017-03-13 | Stop reason: HOSPADM

## 2017-03-13 RX ORDER — ONDANSETRON 2 MG/ML
INJECTION INTRAMUSCULAR; INTRAVENOUS AS NEEDED
Status: DISCONTINUED | OUTPATIENT
Start: 2017-03-13 | End: 2017-03-13 | Stop reason: HOSPADM

## 2017-03-13 RX ORDER — FENTANYL CITRATE 50 UG/ML
INJECTION, SOLUTION INTRAMUSCULAR; INTRAVENOUS
Status: DISCONTINUED
Start: 2017-03-13 | End: 2017-03-13 | Stop reason: HOSPADM

## 2017-03-13 RX ORDER — OXYCODONE AND ACETAMINOPHEN 5; 325 MG/1; MG/1
1-2 TABLET ORAL
Qty: 65 TAB | Refills: 0 | Status: SHIPPED | OUTPATIENT
Start: 2017-03-13 | End: 2021-12-28 | Stop reason: ALTCHOICE

## 2017-03-13 RX ORDER — FENTANYL CITRATE 50 UG/ML
INJECTION, SOLUTION INTRAMUSCULAR; INTRAVENOUS
Status: COMPLETED
Start: 2017-03-13 | End: 2017-03-13

## 2017-03-13 RX ORDER — HYDROMORPHONE HYDROCHLORIDE 2 MG/1
2 TABLET ORAL
Status: COMPLETED | OUTPATIENT
Start: 2017-03-13 | End: 2017-03-13

## 2017-03-13 RX ORDER — DIPHENHYDRAMINE HYDROCHLORIDE 50 MG/ML
12.5 INJECTION, SOLUTION INTRAMUSCULAR; INTRAVENOUS AS NEEDED
Status: DISCONTINUED | OUTPATIENT
Start: 2017-03-13 | End: 2017-03-13 | Stop reason: HOSPADM

## 2017-03-13 RX ORDER — CEFAZOLIN SODIUM IN 0.9 % NACL 2 G/100 ML
PLASTIC BAG, INJECTION (ML) INTRAVENOUS
Status: DISCONTINUED
Start: 2017-03-13 | End: 2017-03-13 | Stop reason: HOSPADM

## 2017-03-13 RX ORDER — LIDOCAINE HYDROCHLORIDE 10 MG/ML
0.1 INJECTION, SOLUTION EPIDURAL; INFILTRATION; INTRACAUDAL; PERINEURAL AS NEEDED
Status: DISCONTINUED | OUTPATIENT
Start: 2017-03-13 | End: 2017-03-13 | Stop reason: HOSPADM

## 2017-03-13 RX ORDER — CEFAZOLIN SODIUM IN 0.9 % NACL 2 G/100 ML
2 PLASTIC BAG, INJECTION (ML) INTRAVENOUS ONCE
Status: COMPLETED | OUTPATIENT
Start: 2017-03-13 | End: 2017-03-13

## 2017-03-13 RX ORDER — MORPHINE SULFATE 10 MG/ML
2 INJECTION, SOLUTION INTRAMUSCULAR; INTRAVENOUS
Status: DISCONTINUED | OUTPATIENT
Start: 2017-03-13 | End: 2017-03-13 | Stop reason: HOSPADM

## 2017-03-13 RX ORDER — HYDROMORPHONE HYDROCHLORIDE 1 MG/ML
.2-.5 INJECTION, SOLUTION INTRAMUSCULAR; INTRAVENOUS; SUBCUTANEOUS ONCE
Status: DISCONTINUED | OUTPATIENT
Start: 2017-03-13 | End: 2017-03-13 | Stop reason: HOSPADM

## 2017-03-13 RX ORDER — KETOROLAC TROMETHAMINE 30 MG/ML
INJECTION, SOLUTION INTRAMUSCULAR; INTRAVENOUS
Status: COMPLETED
Start: 2017-03-13 | End: 2017-03-13

## 2017-03-13 RX ORDER — ROPIVACAINE HYDROCHLORIDE 5 MG/ML
INJECTION, SOLUTION EPIDURAL; INFILTRATION; PERINEURAL
Status: DISCONTINUED
Start: 2017-03-13 | End: 2017-03-13 | Stop reason: HOSPADM

## 2017-03-13 RX ADMIN — FENTANYL CITRATE 25 MCG: 50 INJECTION, SOLUTION INTRAMUSCULAR; INTRAVENOUS at 15:28

## 2017-03-13 RX ADMIN — FENTANYL CITRATE 25 MCG: 50 INJECTION, SOLUTION INTRAMUSCULAR; INTRAVENOUS at 15:23

## 2017-03-13 RX ADMIN — FENTANYL CITRATE 25 MCG: 50 INJECTION, SOLUTION INTRAMUSCULAR; INTRAVENOUS at 15:03

## 2017-03-13 RX ADMIN — MEPERIDINE HYDROCHLORIDE 12.5 MG: 25 INJECTION, SOLUTION INTRAMUSCULAR; INTRAVENOUS; SUBCUTANEOUS at 15:04

## 2017-03-13 RX ADMIN — CEFAZOLIN 2 G: 10 INJECTION, POWDER, FOR SOLUTION INTRAVENOUS; PARENTERAL at 13:00

## 2017-03-13 RX ADMIN — HYDROMORPHONE HYDROCHLORIDE 2 MG: 2 TABLET ORAL at 15:31

## 2017-03-13 RX ADMIN — LIDOCAINE HYDROCHLORIDE 100 MG: 20 INJECTION, SOLUTION EPIDURAL; INFILTRATION; INTRACAUDAL; PERINEURAL at 13:08

## 2017-03-13 RX ADMIN — SODIUM CHLORIDE, SODIUM LACTATE, POTASSIUM CHLORIDE, AND CALCIUM CHLORIDE 25 ML/HR: 600; 310; 30; 20 INJECTION, SOLUTION INTRAVENOUS at 12:00

## 2017-03-13 RX ADMIN — MIDAZOLAM HYDROCHLORIDE 4 MG: 1 INJECTION, SOLUTION INTRAMUSCULAR; INTRAVENOUS at 12:37

## 2017-03-13 RX ADMIN — PROPOFOL 150 MG: 10 INJECTION, EMULSION INTRAVENOUS at 13:08

## 2017-03-13 RX ADMIN — KETOROLAC TROMETHAMINE 30 MG: 30 INJECTION, SOLUTION INTRAMUSCULAR at 15:03

## 2017-03-13 RX ADMIN — DEXAMETHASONE SODIUM PHOSPHATE 8 MG: 4 INJECTION, SOLUTION INTRA-ARTICULAR; INTRALESIONAL; INTRAMUSCULAR; INTRAVENOUS; SOFT TISSUE at 13:34

## 2017-03-13 RX ADMIN — FENTANYL CITRATE 25 MCG: 50 INJECTION, SOLUTION INTRAMUSCULAR; INTRAVENOUS at 15:18

## 2017-03-13 RX ADMIN — ONDANSETRON 4 MG: 2 INJECTION INTRAMUSCULAR; INTRAVENOUS at 14:41

## 2017-03-13 RX ADMIN — MIDAZOLAM HYDROCHLORIDE 1 MG: 1 INJECTION, SOLUTION INTRAMUSCULAR; INTRAVENOUS at 13:00

## 2017-03-13 RX ADMIN — FENTANYL CITRATE 100 MCG: 50 INJECTION, SOLUTION INTRAMUSCULAR; INTRAVENOUS at 12:37

## 2017-03-13 RX ADMIN — FENTANYL CITRATE 25 MCG: 50 INJECTION, SOLUTION INTRAMUSCULAR; INTRAVENOUS at 14:33

## 2017-03-13 NOTE — PERIOP NOTES
Dr. Ashely Schmitt performed femoral and sciatic nerve block in preop using ultrasound machine and nerve stimulatory LLE. Pt on CM x3, 02 by NC at 3L, patient responsive when spoken to. Able to answer questions appropriately. Pt did receives 4mg Versed 100mcg Fentanylfor sedation. Pt tolerated procedure well.  VSS and will continue to monitor

## 2017-03-13 NOTE — DISCHARGE INSTRUCTIONS
You should not take Trazadone for 24 hours or while taking any prescribed narcotics. >>Take Morphine every 12 hrs on a schedule. May take Percocet in between for breakthrough pain<<    DO NOT TAKE TYLENOL/ACETAMINOPHEN WITH PERCOCET    TAKE NARCOTIC PAIN MEDICATIONS WITH FOOD  Narcotics tend to be constipating and we recommend taking a stool softener such as Colace or Miralax (follow package instructions). If you were given prescriptions, please review the written information on the prescribed medications. DO NOT DRIVE WHILE TAKING NARCOTIC PAIN MEDICATIONS. DISCHARGE SUMMARY from Nurse    The following personal items collected during your admission are returned to you:   Dental Appliance: Dental Appliances:  (2 front teeth are implants)  Vision: Visual Aid: None  Hearing Aid:    Jewelry: Jewelry: Necklace  Clothing: Clothing: Footwear, Pants, Shirt, Socks, Undergarments, With patient  Other Valuables: Other Valuables: Cell Phone (in belonging bag)  Valuables sent to safe:        PATIENT INSTRUCTIONS:    After General Anesthesia or Intravenous Sedation, for 24 hours or while taking prescription Narcotics:  · Someone should be with you for the next 24 hours. · For your own safety, a responsible adult must drive you home. · Limit your activities  · Recommended activity: Rest today, up with assistance today. Do not climb stairs or shower unattended for the next 24 hours. · Start with a soft bland diet and advance as tolerated (no nausea) to regular diet. · If you have a sore throat some things that may help are: fluids, warm salt water gargle, or throat lozenges. If this does not improve after several days please follow up with your family physician. · Do not drive and operate hazardous machinery  · Do not make important personal or business decisions  · Do  not drink alcoholic beverages  · If you have not urinated within 8 hours after discharge, please contact your surgeon on call.       Report the following to your surgeon:  · Excessive pain, swelling, redness or odor of or around the surgical area  · Temperature over 100.5  · Nausea and vomiting lasting longer than 4 hours or if unable to take medications  · Any signs of decreased circulation or nerve impairment to extremity: change in color, persistent  numbness, tingling, coldness or increase pain    ·             · If you received a lower extremity nerve block, please use your crutches, walker, or cane until the nerve block has worn off, then refer to your surgeons post-operative instructions.    · You will receive a Post Operative Call from one of the Recovery Room Nurses on the day after your surgery to check on you. It is very important for us to know how you are recovering after your surgery. If you have an issue please call your surgeon, do not wait for the post operative call. · You may receive an e-mail or letter in the mail from Barbara regarding your experience with us in the Ambulatory Surgery Unit. Your feedback is valuable to us and we appreciate your participation in the survey. ·   · If the above instructions are not adequate, please contact Klaus Patel RN, [place] anesthesia Nurse Manager or our Anesthesiologist, at [phone]. If you are having problems after your surgery, call your physician at his office number. ·   · We wish youre a speedy recovery ? What to do at Home:    *  Please give a list of your current medications to your Primary Care Provider. *  Please update this list whenever your medications are discontinued, doses are      changed, or new medications (including over-the-counter products) are added. *  Please carry medication information at all times in case of emergency situations. [place] THROMBOSIS AND PULMONARY EMBOLUS    SURGICAL PATIENTS  Surgical patients are the #1 risk for DVT and PE. WHAT IS DVT?  WHAT IS PE?  DVT is a serious condition where blood clots develop deep in the veins of the legs. PE occurs when a blood clot breaks loose from the wall of a vein and travels to the lungs blocking the pulmonary artery or one of its branches impairing blood flow from the heart, which could result in death. RISK FACTORS   Surgery lasting longer than 45 minutes   History of inflammatory bowel disease   Oral contraceptive or hormone replacement therapy   Immobilization   Varicose veins / swollen legs   Smoking    CHF / Acute MI / Irregular heart beat   Family history of thrombosis   General anesthesia greater than 2 hours   Obesity   Infection of less than one month   Less than 1 month postpartum   COPD / Pneumonia   Arthroscopic surgery   Malignancy / cancer   Spine surgery   Blood abnormalities   Stroke / Paralysis / Coma    SIGNS AND SYMPTOMS OF DEEP VEIN TROMBOSIS  Usually occurs in one leg, above or below the knee   Swelling - one calf or thigh may be larger than the other   Feeling increased warmth in the area of the leg that is swollen or painful   Leg pain, which may increase when standing or walking   Swelling along the vein of the leg   When swollen areas is pressed with a finger, a depression may remain   Tenderness of the leg that may be confined to one area   Change in leg color (bluish or red)    SIGNS AND SYMPTOMS OF PULMONARY EMBOLUS   Chest pain that gets worse with deep breath, coughing or chest movement   Coughing up blood   Sweating   Shortness of breath or difficulty breathing   Rapid heart beat   Lightheadedness    HOW TO REDUCE THE POSSIBLE RISK OF DVT   Exercise - simple activities as rotating ankles and wrists, wiggling toes and fingers, tightening and relaxing muscles in calves and thighs promotes circulation while recovering from surgery. Please do these exercises every hour during waking hours   ALEXANDER hose - If you have been given white support hose while having surgery, wear them home.  You may remove them for half and hour every 8-hour period and stop wearing them 48 hours after surgery or as prescribed by your physician. ALEXANDER hose may be reused for air travel or extended car travel   Take mediation as prescribed by your physician (Lovenox, Coumadin, Aspirin)   Resume your normal activities as soon as your doctor advises you to do so.  Remember, when traveling, to [place] your legs frequently. Wear non skid shoes when ALEXANDER hose are on. They are very slippery! PATIENTS WHO BELIEVE THEY MAY BE EXPERIENCING SIGNS AND SYMPTOMS OF DVT OR PE SHOULD SEEK MEDICAL HELP IMMEDIATELY               These are general instructions for a healthy lifestyle:    No smoking/ No tobacco products/ Avoid exposure to second hand smoke    Surgeon General's Warning:  Quitting smoking now greatly reduces serious risk to your health. Obesity, smoking, and sedentary lifestyle greatly increases your risk for illness    A healthy diet, regular physical exercise & weight monitoring are important for maintaining a healthy lifestyle    You may be retaining fluid if you have a history of heart failure or if you experience any of the following symptoms:  Weight gain of 3 pounds or more overnight or 5 pounds in a week, increased swelling in our hands or feet or shortness of breath while lying flat in bed. Please call your doctor as soon as you notice any of these symptoms; do not wait until your next office visit. Recognize signs and symptoms of STROKE:    F-face looks uneven    A-arms unable to move or move even    S-speech slurred or non-existent    T-time-call 911 as soon as signs and symptoms begin-DO NOT go       Back to bed or wait to see if you get better-TIME IS BRAIN. If you have not had your influenza or pneumococcal vaccines, please follow up with your primary care physician. The discharge information has been reviewed with the patient and caregiver.   The patient and caregiver verbalized understanding.

## 2017-03-13 NOTE — ANESTHESIA PREPROCEDURE EVALUATION
Anesthetic History   No history of anesthetic complications            Review of Systems / Medical History  Patient summary reviewed, nursing notes reviewed and pertinent labs reviewed    Pulmonary  Within defined limits                 Neuro/Psych         Psychiatric history (Bipolar d/o, multiple personalities)     Cardiovascular    Hypertension              Exercise tolerance: >4 METS  Comments: Heart murmur   GI/Hepatic/Renal  Within defined limits              Endo/Other  Within defined limits           Other Findings              Physical Exam    Airway  Mallampati: II  TM Distance: 4 - 6 cm  Neck ROM: normal range of motion   Mouth opening: Normal     Cardiovascular    Rhythm: regular  Rate: normal      Pertinent negatives: No murmur   Dental    Dentition: Caps/crowns  Comments: Upper front x2   Pulmonary  Breath sounds clear to auscultation               Abdominal  GI exam deferred       Other Findings            Anesthetic Plan    ASA: 2  Anesthesia type: general and regional - femoral single shot and sciatic single shot    Monitoring Plan: BIS      Induction: Intravenous  Anesthetic plan and risks discussed with: Patient      Took beta blocker this am

## 2017-03-13 NOTE — ANESTHESIA PROCEDURE NOTES
Peripheral Block    Start time: 3/13/2017 12:34 PM  End time: 3/13/2017 12:46 PM  Performed by: Maggie Ochoa  Authorized by: Maggie Ochoa       Pre-procedure:    Indications: at surgeon's request and post-op pain management    Preanesthetic Checklist: patient identified, risks and benefits discussed, site marked, timeout performed, anesthesia consent given and patient being monitored    Timeout Time: 12:36          Block Type:   Block Type:  Sciatic single shot  Laterality:  Left  Monitoring:  Standard ASA monitoring, responsive to questions and oxygen  Injection Technique:  Single shot  Procedures: nerve stimulator    Patient Position: right lateral decubitus  Prep: chlorhexidine    : transgluteal.  Needle Type:  Stimuplex  Needle Gauge:  21 G  Needle Localization:  Nerve stimulator  Motor Response: minimal motor response >0.4 mA    Medication Injected:  0.5%  ropivacaine  Volume (mL):  20    Assessment:  Number of attempts:  1  Injection Assessment:  Incremental injection every 5 mL, no paresthesia, negative aspiration for blood and no intravascular symptoms  Patient tolerance:  Patient tolerated the procedure well with no immediate complications

## 2017-03-13 NOTE — BRIEF OP NOTE
BRIEF OPERATIVE NOTE    Date of Procedure: 3/13/2017   Preoperative Diagnosis: LEFT KNEE ACL TEAR, MEDIAL MENISCUS TEAR  Postoperative Diagnosis: LEFT KNEE ACL TEAR, MEDIAL MENISCUS TEAR  [place] [facility] [place]cedure(s):  LEFT KNEE ARTHROSCOPY, ANTERIOR CRUCIATE LIGAMENT RECONSTRUCTION, ALLOGRAFT, PARTIAL MEDIAL MENISECTOMY AND CHONDROPLASTY  Surgeon(s) and Role:     * Akbar Adair, DO - Primary            Surgical Staff:  Circ-1: Hayden Centeno RN  Circ-2: Creola Ormond  Scrub Tech-1: Sarthak Dupree  Scrub Tech-2: Pauline Villalobos  Surg Asst-1: Marvin Pham  Event Time In   Incision Start 1321   Incision Close 1440     Anesthesia: General   Estimated Blood Loss: minimal  Specimens: * No specimens in log *   Findings: acl tear   Complications: none  Implants:   Implant Name Type Inv.  Item Serial No.  Lot No. LRB No. Used Action   Allograft Graftlink Convience Pack, Arth[employer] Graft  N/A  [id number] Left 1 Implanted   GRAFT TISS 60-80MML 7.5-10MMD -- Angella Rhoades   GRAFT TISS 60-80MML 7.5-10MMD -- Vadim RAMSEY [id number] [facility]   Left 1 Implanted

## 2017-03-13 NOTE — ANESTHESIA POSTPROCEDURE EVALUATION
Post-Anesthesia Evaluation and Assessment    Patient: Ananda Huynh MRN: [id number]  SSN: [id number]    YOB: 1975  Age: 37 y.o. Sex: female       Cardiovascular Function/Vital Signs  Visit Vitals    /88    Pulse 79    Temp 36.5 °C (97.7 °F)    Resp 16    Ht 5' (1.524 m)    Wt 83.9 kg (185 lb)    SpO2 96%    BMI 36.13 kg/m2       Patient is status post general, regional anesthesia for Procedure(s):  LEFT KNEE ARTHROSCOPY, ANTERIOR CRUCIATE LIGAMENT RECONSTRUCTION, ALLOGRAFT, PARTIAL MEDIAL MENISECTOMY AND CHONDROPLASTY. Nausea/Vomiting: None    Postoperative hydration reviewed and adequate. Pain:  Pain Scale 1: Numeric (0 - 10) (03/13/17 1604)  Pain Intensity 1: 8 (03/13/17 1604)   Managed. Pt's sciatic & femoral blocks are working well. Pt has one spot of discomfort, lateral knee? Treated with multimodal pain meds. Neurological Status:   Neuro (WDL): Exceptions to WDL (03/13/17 1453)  Neuro  Neurologic State: Alert (03/13/17 1458)   At baseline    Mental Status and Level of Consciousness: Arousable    Pulmonary Status:   O2 Device: Room air (03/13/17 1535)   Adequate oxygenation and airway patent    Complications related to anesthesia: None    Post-anesthesia assessment completed. No concerns. Pt c/o pain despite nerve blocks & multiple pain meds in PACU for one small area of postop pain. Does not appear to be in distress. VS not indicative of severe pain. Pt stated preop, 3X, that she didn't want to have any pain for 3 days after surgery. I have explained to her multiple times that having a pain score of 0/10 for the first 3 postop after this surgery is not likely possible. Surgeon aware of issues.       Signed By: Charlie Guzman MD     March 13, 2017

## 2017-03-13 NOTE — ANESTHESIA PROCEDURE NOTES
Peripheral Block    Start time: 3/13/2017 12:48 PM  End time: 3/13/2017 12:55 PM  Performed by: Daphnie Mora  Authorized by: Dpahnie Mora       Pre-procedure:    Indications: at surgeon's request and post-op pain management    Preanesthetic Checklist: patient identified, risks and benefits discussed, site marked, timeout performed, anesthesia consent given and patient being monitored    Timeout Time: 12:36          Block Type:   Block Type:  Femoral single shot  Laterality:  Left  Monitoring:  Standard ASA monitoring, responsive to questions and oxygen  Injection Technique:  Single shot  Procedures: ultrasound guided and nerve stimulator    Patient Position: supine  Prep: chlorhexidine    Location:  Upper thigh  Needle Type:  Stimuplex  Needle Gauge:  22 G  Needle Localization:  Ultrasound guidance and nerve stimulator  Medication Injected:  0.5%  ropivacaine  Volume (mL):  25    Assessment:  Number of attempts:  1  Injection Assessment:  Incremental injection every 5 mL, no paresthesia, local visualized surrounding nerve on ultrasound, negative aspiration for blood and no intravascular symptoms  Patient tolerance:  Patient tolerated the procedure well with no immediate complications

## 2017-03-13 NOTE — PERIOP NOTES
PACU~    Discharge instructions reviewed with adult son. He did not appear interested, was texting occ. Reviewed prescriptions, when and how to take them. Reviewed the purpose of taking the aspirin. Reviewed the brace and polar care usage. Called Dr Shaw Abarca to check on when she will be starting therapy. She should make an appointment with him for 7-10 days and he will start her in therapy at that time. Questions answered. She has had this surgery before and is familiar with the brace and the polar care. Crutches given, set on 4' 12\" (she stated she is 5 ft tall). She states she has walked on crutches before. Instructed that she must wear the brace when she is up walking, and must wear shoes with a rubber sole. Up today with assistance only. Son states she will not be with pt today, his uncle will be there. She lives with her brother.

## 2017-03-13 NOTE — PERIOP NOTES
[facility]  1975  [id number]    Situation:  Verbal report given from: Brittaney Rodriguez and   HUMA De Souza CRNA  Procedure: Procedure(s):  LEFT KNEE ARTHROSCOPY, ANTERIOR CRUCIATE LIGAMENT RECONSTRUCTION, ALLOGRAFT, PARTIAL MEDIAL MENISECTOMY AND CHONDROPLASTY    Background:    Preoperative diagnosis: LEFT KNEE ACL TEAR, MEDIAL MENISCUS TEAR    Postoperative diagnosis: LEFT KNEE ACL TEAR, MEDIAL MENISCUS TEAR    :  Dr. Mark Rebollar    Assistant(s): Circ-1: Rocael Castellano RN  Circ-2: Dulce Temple  Scrub Tech-1: Bushra Diego  Scrub Tech-2: Olivia Gallegos.  Villalobos  Surg Asst-1: Otilio Perez    Specimens: * No specimens in log *    Assessment:  Intra-procedure medications         Anesthesia gave intra-procedure sedation and medications, see anesthesia flow sheet     Intravenous fluids: LR@ KVO     Vital signs stable       Recommendation:    Permission to share finding with family or friend yes

## 2017-03-13 NOTE — IP AVS SNAPSHOT
[address] [phone] Patient: She Elias MRN: [id number] :1975 You are allergic to the following No active allergies Recent Documentation Height Weight BMI OB Status Smoking Status 1.524 m 83.9 kg 36.13 kg/m2 Having regular periods Former Smoker Emergency Contacts Name Discharge Info Relation Home Work Mobile Chandu Cohen DISCHARGE CAREGIVER [3] Child [2]   [phone] Gabino Merritt  Other Relative [6] [phone] About your hospitalization You were admitted on:  2017 You last received care in the:  [facility] [place] [facility] You were discharged on:  2017 Unit phone number:  [phone] Why you were hospitalized Your primary diagnosis was:  Not on File Providers Seen During Your Hospitalizations Provider Role Specialty Primary office phone Nathalia Tamez DO Attending Provider Orthopedic Surgery [phone] Your Primary Care Physician (PCP) Primary Care Physician Office Phone Office Fax Beau Lakhani [phone] Follow-up Information Follow up With Details Comments Contact Info Jr Lau MD   [address] [place] [address] [address] [facility] [address] [phone] Your Appointments   2:30 PM EDT  
COMPLETE PHYSICAL with Jr Lau MD  
[facility] at ED [facility] [address] [facility]) [address] [place] [address] [address] [phone] Current Discharge Medication List  
  
START taking these medications Dose & Instructions Dispensing Information Comments Morning Noon Evening Bedtime  
 morphine CR 15 mg CR tablet Commonly known as:  MS CONTIN Your last dose was: Your next dose is: Other:  _________ Dose:  15 mg Take 1 Tab by mouth every twelve (12) hours. Max Daily Amount: 30 mg.  
 Quantity:  10 Tab Refills:  0  
     
   
   
   
  
 oxyCODONE-acetaminophen 5-325 mg per tablet Commonly known as:  PERCOCET Your last dose was: Your next dose is: Other:  _________ Dose:  1-2 Tab Take 1-2 Tabs by mouth every four (4) hours as needed for Pain. Max Daily Amount: 12 Tabs. Quantity:  65 Tab Refills:  0 CONTINUE these medications which have NOT CHANGED Dose & Instructions Dispensing Information Comments Morning Noon Evening Bedtime  
 busPIRone 15 mg tablet Commonly known as:  BUSPAR Your last dose was: Your next dose is: Other:  _________ Dose:  15 mg Take 1 Tab by mouth two (2) times daily as needed. Quantity:  60 Tab Refills:  0  
     
   
   
   
  
 lisinopril 20 mg tablet Commonly known as:  Abhi Jennings Your last dose was: Your next dose is: Other:  _________ Dose:  20 mg Take 1 Tab by mouth daily. Quantity:  30 Tab Refills:  2  
     
   
   
   
  
 metoprolol succinate 100 mg tablet Commonly known as:  TOPROL-XL Your last dose was: Your next dose is: Other:  _________ Dose:  100 mg Take 1 Tab by mouth daily. Quantity:  90 Tab Refills:  1  
     
   
   
   
  
 traZODone 100 mg tablet Commonly known as:  Oletta Natalia Your last dose was: Your next dose is: Other:  _________ Dose:  100 mg Take 1 Tab by mouth nightly as needed for Sleep. Quantity:  30 Tab Refills:  1 Valproic Acid 250 mg Cpdr  
Commonly known as:  Lindon Meigs Your last dose was: Your next dose is: Other:  _________ Dose:  250 mg Take 250 mg by mouth two (2) times a day. Quantity:  60 Cap Refills:  1 STOP taking these medications TYLENOL-CODEINE #3 300-30 mg per tablet Generic drug:  acetaminophen-codeine Where to Get Your Medications Information on where to get these meds will be given to you by the nurse or doctor. ! Ask your nurse or doctor about these medications  
  morphine CR 15 mg CR tablet  
 oxyCODONE-acetaminophen 5-325 mg per tablet Discharge Instructions DO NOT TAKE TYLENOL/ACETAMINOPHEN WITH PERCOCET, [place], [address] TAKE NARCOTIC PAIN MEDICATIONS WITH FOOD, and if given 2 pain narcotics do NOT take together! Narcotics tend to be constipating and we recommend taking a stool softener such as Colace or Miralax (follow package instructions). If you were given prescriptions, please review the written information on the prescribed medications. DO NOT DRIVE WHILE TAKING NARCOTIC PAIN MEDICATIONS. DISCHARGE SUMMARY from Nurse The following personal items collected during your admission are returned to you:  
Dental Appliance: Dental Appliances:  (2 front teeth are implants) Vision: Visual Aid: None Hearing Aid:   
Jewelry: Jewelry: Phyllis Ruddle Clothing: Clothing: Footwear, Pants, Shirt, Socks, Undergarments, With patient Other Valuables: Other Valuables: Cell Phone (in belonging bag) Valuables sent to safe:   
 
 
PATIENT INSTRUCTIONS: 
 
After General Anesthesia or Intravenous Sedation, for 24 hours or while taking prescription Narcotics: · Someone should be with you for the next 24 hours. · For your own safety, a responsible adult must drive you home. · Limit your activities · Recommended activity: Rest today, up with assistance today. Do not climb stairs or shower unattended for the next 24 hours. · Start with a soft bland diet and advance as tolerated (no nausea) to regular diet.  
· If you have a sore throat some things that may help are: fluids, warm salt water gargle, or throat lozenges. If this does not improve after several days please follow up with your family physician. · Do not drive and operate hazardous machinery · Do not make important personal or business decisions · Do  not drink alcoholic beverages · If you have not urinated within 8 hours after discharge, please contact your surgeon on call. Report the following to your surgeon: 
· Excessive pain, swelling, redness or odor of or around the surgical area · Temperature over 100.5 · Nausea and vomiting lasting longer than 4 hours or if unable to take medications · Any signs of decreased circulation or nerve impairment to extremity: change in color, persistent  numbness, tingling, coldness or increase pain ·  
 
       
· If you received a lower extremity nerve block, please use your crutches, walker, or cane until the nerve block has worn off, then refer to your surgeons post-operative instructions. ?  
· You will receive a Post Operative Call from one of the Recovery Room Nurses on the day after your surgery to check on you. It is very important for us to know how you are recovering after your surgery. If you have an issue please call your surgeon, do not wait for the post operative call. · You may receive an e-mail or letter in the mail from Barbara regarding your experience with us in the Ambulatory Surgery Unit. Your feedback is valuable to us and we appreciate your participation in the survey. ·  
· If the above instructions are not adequate, please contact Arielle Mitchell RN, [place] anesthesia Nurse Manager or our Anesthesiologist, at [phone]. If you are having problems after your surgery, call your physician at his office number. ·  
· We wish youre a speedy recovery ? What to do at Home: *  Please give a list of your current medications to your Primary Care Provider.  
 
*  Please update this list whenever your medications are discontinued, doses are 
 changed, or new medications (including over-the-counter products) are added. *  Please carry medication information at all times in case of emergency situations. [address] THROMBOSIS AND PULMONARY EMBOLUS 
 
SURGICAL PATIENTS Surgical patients are the #1 risk for DVT and PE. WHAT IS DVT? WHAT IS PE? 
DVT is a serious condition where blood clots develop deep in the veins of the legs. PE occurs when a blood clot breaks loose from the wall of a vein and travels to the lungs blocking the pulmonary artery or one of its branches impairing blood flow from the heart, which could result in death. RISK FACTORS 
? Surgery lasting longer than 45 minutes ? History of inflammatory bowel disease 
? Oral contraceptive or hormone replacement therapy ? Immobilization ? Varicose veins / swollen legs ? Smoking  
? CHF / Acute MI / Irregular heart beat ? Family history of thrombosis ? General anesthesia greater than 2 hours ? Obesity ? Infection of less than one month ? Less than 1 month postpartum 
? COPD / Pneumonia ? Arthroscopic surgery ? Malignancy / cancer ? Spine surgery ? Blood abnormalities ? Stroke / Paralysis / Coma SIGNS AND SYMPTOMS OF DEEP VEIN TROMBOSIS Usually occurs in one leg, above or below the knee ? Swelling  one calf or thigh may be larger than the other ? Feeling increased warmth in the area of the leg that is swollen or painful ? Leg pain, which may increase when standing or walking ? Swelling along the vein of the leg ? When swollen areas is pressed with a finger, a depression may remain ? Tenderness of the leg that may be confined to one area ? Change in leg color (bluish or red) SIGNS AND SYMPTOMS OF PULMONARY EMBOLUS 
? Chest pain that gets worse with deep breath, coughing or chest movement ? Coughing up blood ? Sweating ? Shortness of breath or difficulty breathing ? Rapid heart beat ? Lightheadedness HOW TO REDUCE THE POSSIBLE RISK OF DVT ? Exercise  simple activities as rotating ankles and wrists, wiggling toes and fingers, tightening and relaxing muscles in calves and thighs promotes circulation while recovering from surgery. Please do these exercises every hour during waking hours ? ALEXANDER hose  If you have been given white support hose while having surgery, wear them home. You may remove them for half and hour every 8-hour period and stop wearing them 48 hours after surgery or as prescribed by your physician. ALEXANDER hose may be reused for air travel or extended car travel ? Take mediation as prescribed by your physician (Lovenox, Coumadin, Aspirin) ? Resume your normal activities as soon as your doctor advises you to do so. 
? Remember, when traveling, to [place] your legs frequently. Wear non skid shoes when ALEXANDER hose are on. They are very slippery! PATIENTS WHO BELIEVE THEY MAY BE EXPERIENCING SIGNS AND SYMPTOMS OF DVT OR PE SHOULD SEEK MEDICAL HELP IMMEDIATELY These are general instructions for a healthy lifestyle: No smoking/ No tobacco products/ Avoid exposure to second hand smoke Surgeon General's Warning:  Quitting smoking now greatly reduces serious risk to your health. Obesity, smoking, and sedentary lifestyle greatly increases your risk for illness A healthy diet, regular physical exercise & weight monitoring are important for maintaining a healthy lifestyle You may be retaining fluid if you have a history of heart failure or if you experience any of the following symptoms:  Weight gain of 3 pounds or more overnight or 5 pounds in a week, increased swelling in our hands or feet or shortness of breath while lying flat in bed. Please call your doctor as soon as you notice any of these symptoms; do not wait until your next office visit. Recognize signs and symptoms of STROKE: 
 
F-face looks uneven A-arms unable to move or move even S-speech slurred or non-existent T-time-call 911 as soon as signs and symptoms begin-DO NOT go Back to bed or wait to see if you get better-TIME IS BRAIN. If you have not had your influenza or pneumococcal vaccines, please follow up with your primary care physician. The discharge information has been reviewed with the patient and caregiver. The patient and caregiver verbalized understanding. Discharge Instructions Attachments/References MORPHINE, RAPID RELEASE (BY MOUTH) (ENGLISH) OXYCODONE/ACETAMINOPHEN (BY MOUTH) (ENGLISH) Discharge Orders None Introducing [facility]! [employer] introduces IMedExchange patient portal. Now you can access parts of your medical record, email your doctor's office, and request medication refills online. 1. In your internet browser, go to [email or web address] 2. Click on the First Time User? Click Here link in the Sign In box. You will see the New Member Sign Up page. 3. Enter your IMedExchange Access Code exactly as it appears below. You will not need to use this code after youve completed the sign-up process. If you do not sign up before the expiration date, you must request a new code. · IMedExchange Access Code: [email or web address] Expires: 4/3/2017  1:46 PM 
 
4. Enter the last four digits of your Social Security Number (xxxx) and Date of Birth (mm/dd/yyyy) as indicated and click Submit. You will be taken to the next sign-up page. 5. Create a IMedExchange ID. This will be your IMedExchange login ID and cannot be changed, so think of one that is secure and easy to remember. 6. Create a IMedExchange password. You can change your password at any time. 7. Enter your Password Reset Question and Answer. This can be used at a later time if you forget your password. 8. Enter your e-mail address. You will receive e-mail notification when new information is available in [address] 9. Click Sign Up. You can now view and download portions of your medical record. 10. Click the Download Summary menu link to download a portable copy of your medical information. If you have questions, please visit the Frequently Asked Questions section of the Saperion website. Remember, Saperion is NOT to be used for urgent needs. For medical emergencies, dial 911. Now available from your iPhone and Android! General Information Please provide this summary of care documentation to your next provider. Patient Signature:  ____________________________________________________________ Date:  ____________________________________________________________  
  
[facility] Provider Signature:  ____________________________________________________________ Date:  ____________________________________________________________ More Information Morphine, Rapid Release (By mouth) Morphine (MOR-feen) Treats moderate to severe pain. This medicine is a narcotic pain reliever for immediate pain relief. Brand Name(s):Roxanol There may be other brand names for this medicine. When This Medicine Should Not Be Used: You should not use this medicine if you have had an allergic reaction to morphine, codeine, hydrocodone, dihydrocodeine, oxycodone, or brands such as Tylox®, Tylenol® No. 3, Vicodin®. You should not use this medicine or if you have breathing problems, severe asthma, or a stomach problem known as paralytic ileus. How to Use This Medicine:  
Capsule, Tablet, Liquid · Your doctor will tell you how much of this medicine to take and how often. Do not take more medicine or take it more often than your doctor tells you to. · Measure the oral liquid medicine with a marked measuring spoon or medicine cup.  
· If you cannot swallow the capsule whole, you may open it and mix the medicine pellets with a small amount of applesauce, pudding, juice, or water. Swallow the mixture right away, without chewing or crushing the pellets. · You may take this medicine with food or milk if it upsets your stomach. Drink plenty of water and get plenty of exercise to avoid constipation. If a dose is missed: · If you miss a dose or forget to take your medicine, take it as soon as you can. If it is almost time for your next dose, wait until then to take the medicine and skip the missed dose. Do not use extra medicine to make up for a missed dose. How to Store and Dispose of This Medicine: · Store the medicine at room temperature in a closed container, away from heat, moisture, and direct light. Do not freeze the oral liquid. · Ask your pharmacist about the best way to dispose of medicine you do not use. · Keep all medicine away from children and never share your medicine with anyone. Drugs and Foods to Avoid: Ask your doctor or pharmacist before using any other medicine, including over-the-counter medicines, vitamins, and herbal products. · Make sure your doctor knows if you are also using buprenorphine (Buprenex®), butorphanol (Stadol®), cyclosporine (Neoral®, Sandimmune®), dezocine (Elder Bolster), esmolol (Brevibloc®), metformin (Elwyn Isaac), methohexital (Brevital®), nalbuphine (Nubain®), pentazocine (Stacy Echevaria), rifampin (Rifamate®, Rifater®), tramadol (Ultram®), diuretics (\"water pills\"), phenothiazines (such as Compazine®, Phenergan®, Serentil®, Thorazine®), or an MAO inhibitor (Eldepryl®, [place], Georgina, Juancarlos). · Do not drink alcohol while you are using this medicine. Make sure your doctor knows if you are using any other medicine for pain relief, or any medicines that make you sleepy (such as sleeping pills, cold and allergy medicine, or sedatives). Warnings While Using This Medicine: · Make sure your doctor knows if you are pregnant or breastfeeding, or if you have emphysema or other breathing problems, stomach problems or intestinal blockage, low blood pressure, seizures, [place]'s disease, liver or kidney disease, pancreatitis, prostate disorders, thyroid disorders, problems with urination, or a history of depression, mental illness, alcoholism, head injury, or brain tumor. · This medicine may be habit-forming. If you feel that the medicine is not working as well, do not take more than your prescribed dose. Call your doctor for instructions. · Do not stop using this medicine suddenly without asking your doctor. You may need to slowly decrease your dose before stopping it completely. · This medicine may make you dizzy or drowsy. Avoid driving, using machines, or doing anything else that could be dangerous if you are not alert. · This medicine may cause constipation, especially with long-term use. Ask your doctor if you should use a laxative to prevent and treat constipation. Possible Side Effects While Using This Medicine:  
Call your doctor right away if you notice any of these side effects: · Allergic reaction: Itching or hives, swelling in face or hands, swelling or tingling in the mouth or throat, tightness in chest, trouble breathing · Confusion, lightheadedness, or fainting · Decrease in how much or how often you urinate · Extreme weakness, shallow breathing, irregular heartbeat, sweating, cold or clammy skin · Fast, slow, or irregular heartbeat · Swelling in your hands, ankles, or feet If you notice these less serious side effects, talk with your doctor: · Blurred vision · Feelings of extreme happiness or sadness · Nausea or vomiting If you notice other side effects that you think are caused by this medicine, tell your doctor. Call your doctor for medical advice about side effects. You may report side effects to FDA at 1-230-FDA-4273 © 2016 [address] is for End User's use only and may not be sold, redistributed or otherwise used for commercial purposes. The above information is an  only. It is not intended as medical advice for individual conditions or treatments. Talk to your doctor, nurse or pharmacist before following any medical regimen to see if it is safe and effective for you. Oxycodone/Acetaminophen (By mouth) Acetaminophen (g-bgsi-g-MIN-oh-fen), Oxycodone Hydrochloride (zn-v-LZX-done cece-droe-KLOR-jf) Treats moderate to moderately severe pain. This medicine is a narcotic pain reliever. Brand Name(s):Endocet, Percocet, Primlev, Roxicet, Xartemis XR There may be other brand names for this medicine. When This Medicine Should Not Be Used: This medicine is not right for everyone. Do not use it if you had an allergic reaction to acetaminophen or oxycodone, or if you have serious breathing problems (such as severe asthma, hypercarbia, respiratory depression) or paralytic ileus. How to Use This Medicine:  
Capsule, Liquid, Tablet, Long Acting Tablet · Your doctor will tell you how much medicine to use. Do not use more than directed. · Measure the oral liquid medicine with a marked measuring spoon, oral syringe, or medicine cup. · Swallow the extended-release tablet whole. Do not crush, break, or chew it. Do not lick or wet the tablet before placing it in your mouth. Do not give this medicine through a feeding tube. · This medicine should come with a Medication Guide. Ask your pharmacist for a copy if you do not have one. · Store the medicine in a closed container at room temperature, away from heat, moisture, and direct light. Ask your pharmacist about the best way to dispose of medicine you do not use. Drugs and Foods to Avoid: Ask your doctor or pharmacist before using any other medicine, including over-the-counter medicines, vitamins, and herbal products. · Do not use Xartemis XR if you have used an MAO inhibitor in the past 14 days. · Some medicines and foods can affect how this medicine works.  Tell your doctor if you are taking any of the following: ¨ Butorphanol, lamotrigine, nalbuphine, naltrexone, pentazocine, propranolol, zidovudine ¨ Birth control pills, a diuretic (water pill), muscle relaxants, a phenothiazine medicine (chlorpromazine, perphenazine, promethazine, prochlorperazine, thioridazine) · Do not drink alcohol while you are using this medicine. Acetaminophen can damage your liver, and alcohol can increase this risk. Do not take acetaminophen without asking your doctor if you have 3 or more drinks of alcohol every day. · Tell your doctor if you are using any medicine that makes you sleepy, such as allergy medicine or other narcotic pain medicine. Warnings While Using This Medicine: · Tell your doctor if you are pregnant, or if you have kidney disease, liver disease, heart disease, low blood pressure, breathing problems or lung disease, especially if you have asthma, COPD, cor pulmonale, or kyphoscoliosis (curved spine that causes breathing trouble). Tell your doctor if you have urination problems, an underactive thyroid, [place] disease, pancreas or prostate problems, or a stomach disorder. Tell your doctor if you have a history of head injury or brain damage, seizures, or alcohol or drug abuse. Tell your doctor if you are allergic to codeine. · Do not breastfeed while you are using this medicine, unless otherwise directed by your doctor. · This medicine may cause the following problems: 
¨ Liver problems ¨ Serious skin reactions ¨ Low blood pressure · If you take this medicine for more than a few weeks, do not stop taking it suddenly. Your doctor will need to slowly decrease your dose before you stop it completely. · Get emergency help immediately if you think you may have taken too much of this medicine. Signs of an overdose include shallow breathing, fainting, confusion, nausea, vomiting, pinpoint pupils of the eyes, or pale or blue lips, fingernails, or skin. · This medicine may make you dizzy or drowsy. Do not drive or do anything that could be dangerous until you know how this medicine affects you. · This medicine contains acetaminophen. Read the labels of all other medicines you are using to see if they also contain acetaminophen, or ask your doctor or pharmacist. Rob Holman not use more than 4 grams (4,000 milligrams) total of acetaminophen in one day. · This medicine can be habit-forming. Do not use more than your prescribed dose. Call your doctor if you think your medicine is not working. · This medicine may cause constipation, especially with long-term use. Ask your doctor if you should use a laxative to prevent and treat constipation. · Keep all medicine out of the reach of children. Never share your medicine with anyone. Possible Side Effects While Using This Medicine:  
Call your doctor right away if you notice any of these side effects: · Allergic reaction: Itching or hives, swelling in your face or hands, swelling or tingling in your mouth or throat, chest tightness, trouble breathing · Dark urine or pale stools, nausea, vomiting, loss of appetite, stomach pain, yellow skin or eyes · Extreme weakness, shallow breathing, uneven heartbeat, seizures, sweating, or cold or clammy skin · Lightheadedness, dizziness, or fainting · Trouble breathing If you notice these less serious side effects, talk with your doctor: · Constipation · Headache · Mild lightheadedness, sleepiness, or drowsiness · Mild nausea or vomiting If you notice other side effects that you think are caused by this medicine, tell your doctor. Call your doctor for medical advice about side effects. You may report side effects to FDA at 1-307-FDA-0047 © 2016 [address] is for End User's use only and may not be sold, redistributed or otherwise used for commercial purposes. The above information is an  only.  It is not intended as medical advice for individual conditions or treatments. Talk to your doctor, nurse or pharmacist before following any medical regimen to see if it is safe and effective for you.

## 2017-03-13 NOTE — PERIOP NOTES
0 Pt admitted to PACU. Talking calmly. No c/o discomfort. 1500  C/O severe pain left  Lateral knee 9/10.    1503 Denies any relief, crying out intermittently with c/o severe pain 9/10 and then calmly talking. Given 30mg toradol IV and 25 mcg fentanyl IV.    1504   Demerol 12.5 mg given IV. Pain 9/10.    1509 pain 9/10 given 12.5mg Demerol  IV.    1518 Denies any pain relief. Talking calmly and drinking soda. Medicated with 25 mcg fentanyl IV    1523 Denies any pain relief when asked. Otherwise calmly talking, Medicated with Fentanyl 25 mcg. Fentanyl repeated at 1528.    1530  Pt states po morphine usually helps pain. States pain continues at 9/10 Consult with . Pt given Dilaudid 2mg po.    1550 Noted sleeping in short intervals. When awake states pain is a little better 8/10      1600 No change in pain. States it is only one spot on left lateral knee. Pt calm talking grimaces when asked if she is having pain. 602 [address] Dr. Kely Uriarte updated on patient c/o sevre pain and calm facial expressions. States ok to d/c patient home.

## 2017-03-13 NOTE — PERIOP NOTES
Pt discharged via wheelchair, accompanied by RN. Pt discharged awake and alert, respirations equal and unlabored, skin warm, dry, and intact. Pt and family members' questions and concerns addressed prior to discharge. Pt denies any pain relief states pain 8/10. Calmly without any grimace or v erbal complaints lifts leg on bed and getting in car and ambulates safely on crutches.

## 2017-03-14 NOTE — OP NOTES
[facility]   [address] [place] [address] Ave   OP NOTE       Name:  Maria Reyes   MR#:  [id number]   :  1975   Account #:  [de-identified]    Surgery Date:  2017   Date of Adm:  2017       PREOPERATIVE DIAGNOSES    1. Left knee anterior cruciate ligament tear. 2. Left knee complex posterior medial meniscus tear. 3. Left knee grade 3 chondral changes isolated medial femoral   condyle. POSTOPERATIVE DIAGNOSES    1. Left knee anterior cruciate ligament tear. 2. Left knee complex posterior medial meniscus tear. 3. Left knee grade 3 chondral changes isolated medial femoral   condyle. PROCEDURES PERFORMED    1. Left knee arthroscopic-assisted, all inside, anterior cruciate   ligament reconstruction using a hamstring allograft with a graft link   preparation. 2. Left knee arthroscopic posterior medial meniscectomy. 3. Left knee arthroscopic chondroplasty, medial femoral condyle. SURGEON: Arnoldo Gallegos DO    ASSISTANT: None. ANESTHESIA: General with a nerve block. ESTIMATED BLOOD LOSS: Minimal.    COMPLICATIONS: None. SPECIMENS REMOVED: None. DISPOSITION: Stable to PACU. INDICATIONS FOR PROCEDURE: The patient is a 52-year-old   female who notes that she tore her ACL approximately 1 year ago with   a knee injury when she was wrestling with her daughter. She describes   a pop and workup in the office including an MRI which did reveal an   ACL tear. She has a history of a right knee anterior cruciate ligament   tear and reconstruction in the past. On presentation to the office and   after workup, she wished to discuss surgical treatment options. We discussed the risks and benefits of left knee anterior cruciate   ligament reconstruction with partial medial meniscectomy and possible   chondroplasty.  Risks of infection, blood loss, neurovascular injury,   anesthesia risk, and risks secondary to the patient's comorbidities,   were described. We also discussed the different types of ACL graft that   could used. She elected to have an allograft for her ACL graft. We   discussed risks of recurrent laxity in the knee and possibility of   stiffness in the knee after an ACL reconstruction. We also discussed   the strict postoperative regimen that she would have to follow for a   successful outcome. She understood these risks and informed consent   was obtained. The patient was seen in the preoperative holding area at [facility] on 03/13/2017. History and   physical forms and consent forms were confirmed in the chart. She   was given a nerve block by Anesthesia. DESCRIPTION OF PROCEDURE: The patient was taken to the   OR and transferred to the operating room table. Her operative   extremity had been marked in the preoperative holding area. She was   given sedation and an LMA was placed by Anesthesia. Sterile   prepping and draping of the leg after placing it in a leg monroe was   performed. Esmarch was used to exsanguinate the left lower extremity. Tourniquet was inflated to 325 mmHg. After sterile prepping and   draping, a time-out was called. The patient was identified by name,   date of birth, operative site, and operative procedure. After all were in   agreement that the left knee was the operative extremity, an incision   was made for the lateral knee arthroscopy portal. Diagnostic   arthroscopy at the patellofemoral joint revealed no significant chondral   changes. We moved to the medial compartment of the knee, which   revealed her longitudinal posterior medial meniscus tear that did seem   chronic in nature. Arthroscopic pictures were taken. This tear was in   the white-white to red-white zone. Therefore, as this seemed chronic   and unlikely to heal, we decided to perform a partial medial   meniscectomy.  A medial portal was made through an outside-in   technique, and after probing and assessing this posterior medial   meniscus tear, arthroscopic punch device and the arthroscopic shaver   were used to debride the meniscus tear to a nice stable rim. The roots   were noted to be intact. We did identify a small isolated area at the   lateral weightbearing surface of the medial femoral condyle with grade   3 chondral changes. This was debrided with the arthroscopic shaver. We then moved to the lateral compartment of the knee, which revealed   intact chondral surface of the lateral meniscus. We moved to the notch   and identified her chronic anterior cruciate ligament tear. We began by   debriding the lateral wall. After the lateral wall was debrided, we   prepared our site for our femoral tunnel. Our ACL allograft use for this   procedure was a size 9.5. We checked this after measuring and   prepared our hamstring allograft for an all-inside graft Arthrex   technique. We then used a FlipCutter to place our femoral tunnel in the   appropriate position in approximately the 2:30 position at the lateral   wall. We ensured that we had enough posterior wall to prevent any   breakthrough. We back reamed our femoral tunnel approximately 27   mm in depth. We had a total of 30 to 35 for total working length of our   guide pin. We ensured that the notch was cleaned and we placed a   suture passing wire through this tunnel. We then moved to the tibial   insertion point of the ACL. This area was cleaned of any bony soft   tissues. We then used the tibial FlipCutter guide in a very similar   technique to perform a tibial tunnel placement. The tibial tunnel was   made approximately 25-27 mm in depth. This would accommodate our   70 mm all-inside graft very well. We then cleaned our tibial tunnel. FiberWire sutures were passed into both these tunnels for later graft   passage. We pulled the FiberWire sutures through the medial portal,   which was enlarged for passage of our graft.  The graft was then passed, femoral tunnel first, into the knee. Once we seated the femoral   tunnel portion and made sure that our button was flipped, at the   femoral side, we seated our tibial side. We were able to alternate back   and forth between the femoral tension and tibial tension to ensure that   approximately 20 mm of graft was in the femoral and tibial tunnels. We tightened in flexion and extension. We confirmed this tightening by   probing and performing a Lachman's exam intraoperatively. Excellent   tightness graft was noted. Excellent placement of our tunnel was   noted. Arthroscopic fluid was drained from the knee after final pictures   were taken. We used 3-0 nylon suture to close the portal sites. Sterile   dressings were applied, and tourniquet was deflated. Less than 2   hours of tourniquet time was used. The patient was awakened by   Anesthesia after her hinged knee brace was placed locked in   extension. She was transferred to the PACU in stable condition. She   was given specific instructions regarding dressing changes, activity   restrictions and followup information. She will follow up in the office in   7-10 days for suture removal and for starting physical therapy.         Ryan Greene DO DD / TAY   D:  03/14/2017   08:50   T:  03/14/2017   11:33   Job #:  [id number]

## 2017-03-21 ENCOUNTER — TELEPHONE (OUTPATIENT)
Dept: FAMILY MEDICINE CLINIC | Age: 42
End: 2017-03-21

## 2020-10-25 ENCOUNTER — HOSPITAL ENCOUNTER (EMERGENCY)
Age: 45
Discharge: HOME | End: 2020-10-25
Attending: EMERGENCY MEDICINE
Payer: MEDICAID

## 2020-10-25 VITALS
SYSTOLIC BLOOD PRESSURE: 155 MMHG | RESPIRATION RATE: 16 BRPM | BODY MASS INDEX: 26.75 KG/M2 | HEIGHT: 60 IN | DIASTOLIC BLOOD PRESSURE: 110 MMHG | HEART RATE: 108 BPM | TEMPERATURE: 98.2 F | OXYGEN SATURATION: 100 % | WEIGHT: 136.24 LBS

## 2020-10-25 DIAGNOSIS — Z20.2 POSSIBLE EXPOSURE TO STD: ICD-10-CM

## 2020-10-25 DIAGNOSIS — N76.0 VAGINITIS AND VULVOVAGINITIS: Primary | ICD-10-CM

## 2020-10-25 DIAGNOSIS — A59.01 TRICHOMONAS VAGINALIS (TV) INFECTION: ICD-10-CM

## 2020-10-25 LAB
CLUE CELLS VAG QL WET PREP: NORMAL
HCG UR QL: NEGATIVE
KOH PREP SPEC: NORMAL
SERVICE CMNT-IMP: NORMAL
T VAGINALIS VAG QL WET PREP: NORMAL

## 2020-10-25 PROCEDURE — 74011250637 HC RX REV CODE- 250/637: Performed by: PHYSICIAN ASSISTANT

## 2020-10-25 PROCEDURE — 74011250636 HC RX REV CODE- 250/636: Performed by: PHYSICIAN ASSISTANT

## 2020-10-25 PROCEDURE — 74011000250 HC RX REV CODE- 250: Performed by: PHYSICIAN ASSISTANT

## 2020-10-25 PROCEDURE — 87086 URINE CULTURE/COLONY COUNT: CPT

## 2020-10-25 PROCEDURE — 87210 SMEAR WET MOUNT SALINE/INK: CPT

## 2020-10-25 PROCEDURE — 96372 THER/PROPH/DIAG INJ SC/IM: CPT

## 2020-10-25 PROCEDURE — 81025 URINE PREGNANCY TEST: CPT

## 2020-10-25 PROCEDURE — 81001 URINALYSIS AUTO W/SCOPE: CPT

## 2020-10-25 PROCEDURE — 87491 CHLMYD TRACH DNA AMP PROBE: CPT

## 2020-10-25 PROCEDURE — 99283 EMERGENCY DEPT VISIT LOW MDM: CPT

## 2020-10-25 RX ORDER — AZITHROMYCIN 250 MG/1
1000 TABLET, FILM COATED ORAL
Status: COMPLETED | OUTPATIENT
Start: 2020-10-25 | End: 2020-10-25

## 2020-10-25 RX ORDER — METRONIDAZOLE 500 MG/1
2000 TABLET ORAL
Qty: 4 TAB | Refills: 0 | Status: SHIPPED | OUTPATIENT
Start: 2020-10-25 | End: 2020-10-25

## 2020-10-25 RX ADMIN — AZITHROMYCIN 1000 MG: 250 TABLET, FILM COATED ORAL at 16:14

## 2020-10-25 RX ADMIN — LIDOCAINE HYDROCHLORIDE 250 MG: 10 INJECTION, SOLUTION EPIDURAL; INFILTRATION; INTRACAUDAL; PERINEURAL at 16:14

## 2020-10-25 NOTE — ED PROVIDER NOTES
EMERGENCY DEPARTMENT HISTORY AND PHYSICAL EXAM      Date: 10/25/2020  Patient Name: Francois Sheridan    History of Presenting Illness     Chief Complaint   Patient presents with    Vaginal Itching       History Provided By: Patient    HPI: Francois Sheridan, 41 y.o. female with a history of anxiety and multiple personalities presents ambulatory to the ED with cc of 3 to 4 days of mild but constant vaginal irritation, slight discharge and some burning with urination. She tells me that one of her sexual partners notified her that he tested positive for chlamydia. She does have a past history of chlamydia and believes this feels similar. She denies abdominal pain. There has been no fever recently. She denies chest pain or shortness of breath. She is here for evaluation and treatment of possible STDs. There are no other complaints, changes, or physical findings at this time. PCP: Shanelle Moreno MD    Current Outpatient Medications   Medication Sig Dispense Refill    metroNIDAZOLE (FlagyL) 500 mg tablet Take 4 Tabs by mouth now for 1 dose. Take all 4 pills one time for trichomonas infection (STD) 4 Tab 0    oxyCODONE-acetaminophen (PERCOCET) 5-325 mg per tablet Take 1-2 Tabs by mouth every four (4) hours as needed for Pain. Max Daily Amount: 12 Tabs. 65 Tab 0    morphine CR (MS CONTIN) 15 mg CR tablet Take 1 Tab by mouth every twelve (12) hours. Max Daily Amount: 30 mg. 10 Tab 0    lisinopril (PRINIVIL, ZESTRIL) 20 mg tablet Take 1 Tab by mouth daily. 30 Tab 2    busPIRone (BUSPAR) 15 mg tablet Take 1 Tab by mouth two (2) times daily as needed. 60 Tab 0    Valproic Acid (DEPAKENE) 250 mg cpDR Take 250 mg by mouth two (2) times a day. 60 Cap 1    traZODone (DESYREL) 100 mg tablet Take 1 Tab by mouth nightly as needed for Sleep. 30 Tab 1    metoprolol succinate (TOPROL-XL) 100 mg XL tablet Take 1 Tab by mouth daily.  80 Tab 1     Past History     Past Medical History:  Past Medical History: Diagnosis Date    Adverse effect of anesthesia     \"heart kept stopping during the birth of my daughter\"    Anxiety     Cancer ([address]     \"uterine cancer\" treated with cryo    History of bilateral tubal ligation 1/3/2017    Hypertension     Personality disorder     multiple personalities       Past Surgical History:  Past Surgical History:   Procedure Laterality Date    HX ACL RECONSTRUCTION Right     HX GYN      c section x3    HX TUBAL LIGATION         Family History:  Family History   Problem Relation Age of Onset    Stroke Mother    Mark Snider Bladder Disease Mother     Stroke Father     Heart Disease Brother        Social History:  Social History     Tobacco Use    Smoking status: Former Smoker     Packs/day: 0.50     Years: 30.00     Pack years: 15.00     Last attempt to quit: 1/3/2014     Years since quittin.8    Smokeless tobacco: Never Used   Substance Use Topics    Alcohol use: No    Drug use: No       Allergies:  No Known Allergies  Review of Systems   Review of Systems   Constitutional: Negative for fatigue and fever. HENT: Negative for ear pain and sore throat. Eyes: Negative for pain, redness and visual disturbance. Respiratory: Negative for cough and shortness of breath. Cardiovascular: Negative for chest pain and palpitations. Gastrointestinal: Negative for abdominal pain, nausea and vomiting. Genitourinary: Positive for dysuria, vaginal discharge and vaginal pain. Negative for frequency and urgency. Musculoskeletal: Negative for back pain, gait problem, neck pain and neck stiffness. Skin: Negative for rash and wound. Neurological: Negative for dizziness, weakness, light-headedness, numbness and headaches. Physical Exam   Physical Exam  Vitals signs and nursing note reviewed. Constitutional:       General: She is not in acute distress. Appearance: She is well-developed. She is not toxic-appearing. HENT:      Head: Normocephalic and atraumatic. Jaw: No trismus. Right Ear: External ear normal.      Left Ear: External ear normal.      Nose: Nose normal.      Mouth/Throat:      Pharynx: Uvula midline. Eyes:      General: No scleral icterus. Conjunctiva/sclera: Conjunctivae normal.      Pupils: Pupils are equal, round, and reactive to light. Neck:      Musculoskeletal: Full passive range of motion without pain and normal range of motion. Cardiovascular:      Rate and Rhythm: Normal rate and regular rhythm. Pulmonary:      Effort: Pulmonary effort is normal. No tachypnea, accessory muscle usage or respiratory distress. Breath sounds: No decreased breath sounds or wheezing. Abdominal:      Palpations: Abdomen is soft. Tenderness: There is no abdominal tenderness. Genitourinary:     Comments:   Deferred in favor of self swabs  Musculoskeletal: Normal range of motion. Skin:     Findings: No rash. Neurological:      Mental Status: She is alert and oriented to person, place, and time. She is not disoriented. GCS: GCS eye subscore is 4. GCS verbal subscore is 5. GCS motor subscore is 6. Cranial Nerves: No cranial nerve deficit. Psychiatric:         Speech: Speech normal.       Diagnostic Study Results     Labs -   No results found for this or any previous visit (from the past 12 hour(s)). Radiologic Studies -   No orders to display     CT Results  (Last 48 hours)    None        CXR Results  (Last 48 hours)    None        Medical Decision Making   I am the first provider for this patient. I reviewed the vital signs, available nursing notes, past medical history, past surgical history, family history and social history. Vital Signs-Reviewed the patient's vital signs.   Patient Vitals for the past 12 hrs:   Temp Pulse Resp BP SpO2   10/25/20 1455 98.2 °F (36.8 °C) (!) 108 16 (!) 155/110 100 %       Pulse Oximetry Analysis - 100% on RA    Records Reviewed: Nursing Notes, Old Medical Records, Previous Radiology Studies and Previous Laboratory Studies    Provider Notes (Medical Decision Making):   DDx includes STI, BV, candidiasis, UTI, other. Self swab for: KOH / wet prep / Kylie Pacer; will obtain UA / POC UhCG; treatment based on results; patient requests empiric treatment for GC    5:51 PM  This is a late entry. Patient needed to leave prior to her KOH and wet prep results. Presently, we are on a laboratory downtime and lab results are delayed and brought by . The patient's wet prep demonstrates trichomonas and clue cells. A prescription for 2 g of oral metronidazole is sent electronically to the pharmacy on record. Attempt to contact the patient with the numbers listed yield a voicemail and a person who identifies himself as her brother. Instructions are left on the voicemail to return the call. ED Course:   Initial assessment performed. The patients presenting problems have been discussed, and they are in agreement with the care plan formulated and outlined with them. I have encouraged them to ask questions as they arise throughout their visit. Disposition:  Discharge    PLAN:  1. Discharge Medication List as of 10/25/2020  4:22 PM      CONTINUE these medications which have NOT CHANGED    Details   oxyCODONE-acetaminophen (PERCOCET) 5-325 mg per tablet Take 1-2 Tabs by mouth every four (4) hours as needed for Pain. Max Daily Amount: 12 Tabs., Print, Disp-65 Tab, R-0      morphine CR (MS CONTIN) 15 mg CR tablet Take 1 Tab by mouth every twelve (12) hours. Max Daily Amount: 30 mg., Print, Disp-10 Tab, R-0      lisinopril (PRINIVIL, ZESTRIL) 20 mg tablet Take 1 Tab by mouth daily. , Normal, Disp-30 Tab, R-2      busPIRone (BUSPAR) 15 mg tablet Take 1 Tab by mouth two (2) times daily as needed. , Print, Disp-60 Tab, R-0      Valproic Acid (DEPAKENE) 250 mg cpDR Take 250 mg by mouth two (2) times a day., Normal, Disp-60 Cap, R-1      traZODone (DESYREL) 100 mg tablet Take 1 Tab by mouth nightly as needed for Sleep., Normal, Disp-30 Tab, R-1      metoprolol succinate (TOPROL-XL) 100 mg XL tablet Take 1 Tab by mouth daily. , Normal, Disp-90 Tab, R-1           2. Follow-up Information     Follow up With Specialties Details Why Contact Info    Viktoriya Lloyd MD [facility] Schedule an appointment as soon as possible for a visit PRIMARY CARE: call tomorrow regarding follow up on your lab results and ED visit [address]  [facility] [address]  [facility].  [phone]          Return to ED if worse     Diagnosis     Clinical Impression:   1. Vaginitis and vulvovaginitis    2. Possible exposure to STD    3.  Trichomonas vaginalis (TV) infection

## 2020-10-25 NOTE — ED TRIAGE NOTES
Told by her partner that they tested positive for Chlamydia and she has been having vaginal swelling, itching and slight dysuria for 3-4 days

## 2020-10-26 LAB
APPEARANCE UR: CLEAR
BACTERIA URNS QL MICRO: NEGATIVE /HPF
BILIRUB UR QL: NEGATIVE
C TRACH DNA SPEC QL NAA+PROBE: NEGATIVE
COLOR UR: ABNORMAL
EPITH CASTS URNS QL MICRO: ABNORMAL /LPF
GLUCOSE UR STRIP.AUTO-MCNC: NEGATIVE MG/DL
HGB UR QL STRIP: NEGATIVE
HYALINE CASTS URNS QL MICRO: ABNORMAL /LPF (ref 0–5)
KETONES UR QL STRIP.AUTO: NEGATIVE MG/DL
LEUKOCYTE ESTERASE UR QL STRIP.AUTO: ABNORMAL
N GONORRHOEA DNA SPEC QL NAA+PROBE: NEGATIVE
NITRITE UR QL STRIP.AUTO: NEGATIVE
PH UR STRIP: 7 [PH] (ref 5–8)
PROT UR STRIP-MCNC: NEGATIVE MG/DL
RBC #/AREA URNS HPF: ABNORMAL /HPF (ref 0–5)
SAMPLE TYPE: NORMAL
SERVICE CMNT-IMP: NORMAL
SP GR UR REFRACTOMETRY: 1.02 (ref 1–1.03)
SPECIMEN SOURCE: NORMAL
TRICHOMONAS UR QL MICRO: PRESENT
UA: UC IF INDICATED,UAUC: ABNORMAL
UROBILINOGEN UR QL STRIP.AUTO: 0.2 EU/DL (ref 0.2–1)
WBC URNS QL MICRO: ABNORMAL /HPF (ref 0–4)

## 2020-10-27 LAB
BACTERIA SPEC CULT: NORMAL
SERVICE CMNT-IMP: NORMAL

## 2021-02-18 ENCOUNTER — VIRTUAL VISIT (OUTPATIENT)
Dept: FAMILY MEDICINE CLINIC | Age: 46
End: 2021-02-18
Payer: MEDICAID

## 2021-02-18 DIAGNOSIS — F32.A ANXIETY AND DEPRESSION: ICD-10-CM

## 2021-02-18 DIAGNOSIS — F20.89 OTHER SCHIZOPHRENIA (HCC): ICD-10-CM

## 2021-02-18 DIAGNOSIS — Z91.14 NONCOMPLIANCE WITH MEDICATIONS: ICD-10-CM

## 2021-02-18 DIAGNOSIS — F44.81 MULTIPLE PERSONALITY DISORDER (HCC): ICD-10-CM

## 2021-02-18 DIAGNOSIS — F41.9 ANXIETY AND DEPRESSION: ICD-10-CM

## 2021-02-18 DIAGNOSIS — Z76.89 ESTABLISHING CARE WITH NEW DOCTOR, ENCOUNTER FOR: Primary | ICD-10-CM

## 2021-02-18 DIAGNOSIS — Z00.00 ENCOUNTER FOR PREVENTIVE CARE: ICD-10-CM

## 2021-02-18 DIAGNOSIS — Z11.3 ROUTINE SCREENING FOR STI (SEXUALLY TRANSMITTED INFECTION): ICD-10-CM

## 2021-02-18 DIAGNOSIS — Z86.19 HISTORY OF SEXUALLY TRANSMITTED DISEASE: ICD-10-CM

## 2021-02-18 DIAGNOSIS — F31.81 BIPOLAR 2 DISORDER (HCC): ICD-10-CM

## 2021-02-18 DIAGNOSIS — Z13.220 SCREENING CHOLESTEROL LEVEL: ICD-10-CM

## 2021-02-18 DIAGNOSIS — Z79.899 LONG-TERM USE OF HIGH-RISK MEDICATION: ICD-10-CM

## 2021-02-18 DIAGNOSIS — F11.91 HISTORY OF NARCOTIC USE: ICD-10-CM

## 2021-02-18 DIAGNOSIS — Z13.1 SCREENING FOR DIABETES MELLITUS: ICD-10-CM

## 2021-02-18 PROCEDURE — 99443 PR PHYS/QHP TELEPHONE EVALUATION 21-30 MIN: CPT | Performed by: FAMILY MEDICINE

## 2021-02-18 RX ORDER — ALPRAZOLAM 1 MG/1
1 TABLET ORAL
COMMUNITY

## 2021-02-18 RX ORDER — METHYLPHENIDATE HYDROCHLORIDE 20 MG/1
20 TABLET ORAL 3 TIMES DAILY
COMMUNITY

## 2021-02-18 NOTE — PROGRESS NOTES
Abril Cardenas This note will not be viewable in "[employer]t for the following reason(s). Suspected substance abuse disorder. Also a psych note. Also it may be used in a legal case. is a 41 y.o. female evaluated via telephone on 2/18/2021. Consent:  She and/or health care decision maker is aware that she may receive a bill for this telephone service, depending on her insurance coverage, and has provided verbal consent to proceed: Yes      Documentation:  I communicated with the patient and/or health care decision maker about;   Details of this discussion including any medical advice provided: This pt is here to re-establish care. Last saw her 02/2017, 4 years ago. She moved to [facility] and moved back here.      has a past medical history of Adverse effect of anesthesia, Anxiety, Cancer ([facility] History of bilateral tubal ligation (1/3/2017), History of sexually transmitted disease (2/18/2021), Hypertension, Noncompliance with medications (2/18/2021), and Personality disorder ([facility] She donate plasma for some money. Was told needed clearance from doctor. Letter need to say she's not taking metoprolol. Needs labs done. Hx of STD    History of Chronic pain on opiod     Hx of psych on psych meds  Was seeing psych in [facility]  Only takes xanax 1mg and ritalin 20mg TID. I have other medication but I don't take them. Refer to psych. Discussed her responsibility to call and get one. I will not be refilling her xanax or ritalin. She may have trouble finding someone just to write those two and she refuses other psych meds. HTN: was taking metoprolol and Lisinopril but she's not compliant with neither. Diagnoses and all orders for this visit:    1. Establishing care with new doctor, encounter for    2. Encounter for preventive care  -     CBC W/O DIFF; Future  -     HEMOGLOBIN A1C W/O EAG; Future  -     LIPID PANEL; Future  -     METABOLIC PANEL, COMPREHENSIVE;  Future  - TSH 3RD GENERATION; Future  -     HIV 1/2 AG/AB, 4TH GENERATION,W RFLX CONFIRM; Future  -     RPR; Future  -     HEPATITIS PANEL, ACUTE; Future  -     10-DRUG SCREEN, URINE W RFLX CONFIRMATION; Future    3. Bipolar 2 disorder ([facility]  -     REFERRAL TO PSYCHIATRY  -     [facility] W/O DIFF; Future  -     METABOLIC PANEL, COMPREHENSIVE; Future  -     TSH 3RD GENERATION; Future  -     HIV 1/2 AG/AB, 4TH GENERATION,W RFLX CONFIRM; Future  -     RPR; Future  -     HEPATITIS PANEL, ACUTE; Future  -     10-DRUG SCREEN, URINE W RFLX CONFIRMATION; Future    4. Multiple personality disorder ([facility]  -     REFERRAL TO PSYCHIATRY  -     [facility] W/O DIFF; Future  -     METABOLIC PANEL, COMPREHENSIVE; Future  -     TSH 3RD GENERATION; Future  -     HIV 1/2 AG/AB, 4TH GENERATION,W RFLX CONFIRM; Future  -     RPR; Future  -     HEPATITIS PANEL, ACUTE; Future  -     10-DRUG SCREEN, URINE W RFLX CONFIRMATION; Future    5. Other schizophrenia ([facility]  -     REFERRAL TO PSYCHIATRY  -     [facility] W/O DIFF; Future  -     METABOLIC PANEL, COMPREHENSIVE; Future  -     TSH 3RD GENERATION; Future  -     HIV 1/2 AG/AB, 4TH GENERATION,W RFLX CONFIRM; Future  -     RPR; Future  -     HEPATITIS PANEL, ACUTE; Future  -     10-DRUG SCREEN, URINE W RFLX CONFIRMATION; Future    6. Anxiety and depression  -     REFERRAL TO PSYCHIATRY    7. History of narcotic use  -     CBC W/O DIFF; Future  -     METABOLIC PANEL, COMPREHENSIVE; Future  -     TSH 3RD GENERATION; Future  -     HIV 1/2 AG/AB, 4TH GENERATION,W RFLX CONFIRM; Future  -     RPR; Future  -     HEPATITIS PANEL, ACUTE; Future  -     10-DRUG SCREEN, URINE W RFLX CONFIRMATION; Future    8. Routine screening for STI (sexually transmitted infection)  -     CBC W/O DIFF; Future  -     METABOLIC PANEL, COMPREHENSIVE; Future  -     TSH 3RD GENERATION; Future  -     HIV 1/2 AG/AB, 4TH GENERATION,W RFLX CONFIRM; Future  -     RPR;  Future  -     HEPATITIS PANEL, ACUTE; Future  -     10-DRUG SCREEN, URINE W RFLX CONFIRMATION; Future    9. History of sexually transmitted disease  -     CBC W/O DIFF; Future  -     METABOLIC PANEL, COMPREHENSIVE; Future  -     TSH 3RD GENERATION; Future  -     HIV 1/2 AG/AB, 4TH GENERATION,W RFLX CONFIRM; Future  -     RPR; Future  -     HEPATITIS PANEL, ACUTE; Future  -     10-DRUG SCREEN, URINE W RFLX CONFIRMATION; Future    10. Noncompliance with medications    11. Long-term use of high-risk medication  -     CBC W/O DIFF; Future  -     HEMOGLOBIN A1C W/O EAG; Future  -     LIPID PANEL; Future  -     METABOLIC PANEL, COMPREHENSIVE; Future  -     TSH 3RD GENERATION; Future  -     HIV 1/2 AG/AB, 4TH GENERATION,W RFLX CONFIRM; Future  -     RPR; Future  -     HEPATITIS PANEL, ACUTE; Future  -     10-DRUG SCREEN, URINE W RFLX CONFIRMATION; Future    12. Screening for diabetes mellitus  -     HEMOGLOBIN A1C W/O EAG; Future    13. Screening cholesterol level  -     LIPID PANEL; Future      Follow-up and Dispositions    · Return in about 1 week (around 2/25/2021) for labs review. Past Medical History:   Diagnosis Date    Adverse effect of anesthesia     \"heart kept stopping during the birth of my daughter\"    Anxiety     Cancer ([facility]     \"uterine cancer\" treated with cryo    History of bilateral tubal ligation 1/3/2017    History of sexually transmitted disease 2/18/2021    Hypertension     Noncompliance with medications 2/18/2021    Personality disorder ([facility]     multiple personalities       Current Outpatient Medications on File Prior to Visit   Medication Sig Dispense Refill    methylphenidate HCl (Ritalin) 20 mg tablet Take 20 mg by mouth three (3) times daily.  ALPRAZolam (Xanax) 1 mg tablet Take 1 mg by mouth.  oxyCODONE-acetaminophen (PERCOCET) 5-325 mg per tablet Take 1-2 Tabs by mouth every four (4) hours as needed for Pain. Max Daily Amount: 12 Tabs. 65 Tab 0    morphine CR (MS CONTIN) 15 mg CR tablet Take 1 Tab by mouth every twelve (12) hours.  Max Daily Amount: 30 mg. 10 Tab 0    lisinopril (PRINIVIL, ZESTRIL) 20 mg tablet Take 1 Tab by mouth daily. 30 Tab 2    busPIRone (BUSPAR) 15 mg tablet Take 1 Tab by mouth two (2) times daily as needed. 60 Tab 0    Valproic Acid (DEPAKENE) 250 mg cpDR Take 250 mg by mouth two (2) times a day. 60 Cap 1    traZODone (DESYREL) 100 mg tablet Take 1 Tab by mouth nightly as needed for Sleep. 30 Tab 1    metoprolol succinate (TOPROL-XL) 100 mg XL tablet Take 1 Tab by mouth daily. 90 Tab 1     No current facility-administered medications on file prior to visit. I affirm this is a Patient Initiated Episode with a Patient who has not had a related appointment within my department in the past 7 days or scheduled within the next 24 hours.     Total Time: minutes: 21-30 minutes    Note: not billable if this call serves to triage the patient into an appointment for the relevant concern      Kam Moralez MD

## 2021-04-01 ENCOUNTER — TELEPHONE (OUTPATIENT)
Dept: FAMILY MEDICINE CLINIC | Age: 46
End: 2021-04-01

## 2021-04-01 NOTE — TELEPHONE ENCOUNTER
----- Message from Renée Avery sent at 4/1/2021  9:53 AM EDT -----  Regarding: /Telephone    General Message/Vendor Calls    Caller's first and last name: Self      Reason for call: Lab appt request      Callback required yes/no and why: Yes to assist      Best contact number(s): [phone]      Details to clarify the request: Pt requesting a blood pregnancy test.       Renée Avery

## 2021-04-07 DIAGNOSIS — N92.6 MISSED PERIOD: Primary | ICD-10-CM

## 2021-04-07 LAB
EST. AVERAGE GLUCOSE BLD GHB EST-MCNC: 111 MG/DL
HBA1C MFR BLD: 5.5 % (ref 4–5.6)

## 2021-04-09 ENCOUNTER — VIRTUAL VISIT (OUTPATIENT)
Dept: FAMILY MEDICINE CLINIC | Age: 46
End: 2021-04-09
Payer: MEDICAID

## 2021-04-09 DIAGNOSIS — Z76.89 RETURN TO WORK EVALUATION: Primary | ICD-10-CM

## 2021-04-09 DIAGNOSIS — Z91.199 NONCOMPLIANCE: ICD-10-CM

## 2021-04-09 PROCEDURE — 99442 PR PHYS/QHP TELEPHONE EVALUATION 11-20 MIN: CPT | Performed by: FAMILY MEDICINE

## 2021-04-09 NOTE — LETTER
4/12/2021 9:56 AM 
 
Ms. Adry Gayle 84 Past Medical History:  
Diagnosis Date  Adverse effect of anesthesia \"heart kept stopping during the birth of my daughter\"  Anxiety and depression 11/25/2015  Bipolar 2 disorder ([facility] 12/17/2015  Cancer ([facility] \"uterine cancer\" treated with cryo  History of bilateral tubal ligation 1/3/2017  History of sexually transmitted disease 2/18/2021  Hypertension  Multiple personality disorder ([address] 12/17/2015  Noncompliance with medications 2/18/2021  Personality disorder ([address] multiple personalities  Schizophrenia ([facility] 12/17/2015 Sincerely, Bryan Damon MD

## 2021-04-09 NOTE — PROGRESS NOTES
Chief Complaint   Patient presents with    Results       1. Have you been to the ER, urgent care clinic since your last visit? Hospitalized since your last visit? Yes     2. Have you seen or consulted any other health care providers outside of the [address] [place] [address] since your last visit? Include any pap smears or colon screening.  No

## 2021-04-09 NOTE — LETTER
4/9/2021    Ms. [address] [facility] [id number]      Dear Leatha Cohen:    Please find your most recent results below.       HEMOGLOBIN A1C WITH EAG   Result Value Ref Range    Hemoglobin A1c 5.5 4.0 - 5.6 %    Est. average glucose 111 mg/dL       Sincerely,      Jimena Zayas MD

## 2021-04-09 NOTE — PROGRESS NOTES
Maikel Grant is a 41 y.o. female evaluated via telephone on 4/9/2021. Consent:  She and/or health care decision maker is aware that she may receive a bill for this telephone service, depending on her insurance coverage, and has provided verbal consent to proceed: Yes      Documentation:  I communicated with the patient and/or health care decision maker about;   Details of this discussion including any medical advice provided:     2nd visit with this physician,    Both were phone only b/c at the last moment her video or connection doesn't work. Filled out form for her plasma donation    She moved to [facility] and moved back here. S/p tubal ligation. She had called and wanted pregnancy test so we did. It was negative. has a past medical history of Adverse effect of anesthesia, Anxiety and depression (11/25/2015), Bipolar 2 disorder ([facility] (12/17/2015), Cancer ([facility] [facility] History of bilateral tubal ligation (1/3/2017), History of sexually transmitted disease (2/18/2021), Hypertension, Multiple personality disorder ([facility] (12/17/2015), Noncompliance with medications (2/18/2021), Personality disorder ([facility] [facility] and Schizophrenia ([facility] (12/17/2015). Labs reviewed with pt. She donate plasma for some money. Was told needed clearance from doctor. Letter need to say she's not taking metoprolol which she isn't she haven't been compliant with this for 6 months. Last filled her xanax and ritalin 10/22/2020     Hx of STD  Negative for our scan    Hx of bipolar, schizophrenia, multiple personality disorder, anxiety depression. Not compliant with psych meds  Was seeing psych in [facility]  Only takes xanax 1mg and ritalin 20mg TID. Last take 10/22/2020    \"I have other medication but I don't take them\". Refer to psych. Discussed her responsibility to call and get one. I will not be refilling her xanax or ritalin.  She may have trouble finding someone just to write those two and she refuses other psych meds.     HTN: was taking metoprolol and Lisinopril but she's not compliant with neither. Diagnoses and all orders for this visit:    1. Return to work evaluation    2. Noncompliance      Follow-up and Dispositions    · Return in about 6 weeks (around 5/21/2021) for in office, HTN. Past Medical History:   Diagnosis Date    Adverse effect of anesthesia     \"heart kept stopping during the birth of my daughter\"    Anxiety and depression 11/25/2015    Bipolar 2 disorder ([facility] 12/17/2015    Cancer ([facility]     \"uterine cancer\" treated with cryo    History of bilateral tubal ligation 1/3/2017    History of sexually transmitted disease 2/18/2021    Hypertension     Multiple personality disorder ([facility] 12/17/2015    Noncompliance with medications 2/18/2021    Personality disorder ([facility]     multiple personalities    Schizophrenia ([facility] 12/17/2015       Current Outpatient Medications on File Prior to Visit   Medication Sig Dispense Refill    methylphenidate HCl (Ritalin) 20 mg tablet Take 20 mg by mouth three (3) times daily.  ALPRAZolam (Xanax) 1 mg tablet Take 1 mg by mouth.  oxyCODONE-acetaminophen (PERCOCET) 5-325 mg per tablet Take 1-2 Tabs by mouth every four (4) hours as needed for Pain. Max Daily Amount: 12 Tabs. 65 Tab 0    morphine CR (MS CONTIN) 15 mg CR tablet Take 1 Tab by mouth every twelve (12) hours. Max Daily Amount: 30 mg. 10 Tab 0    lisinopril (PRINIVIL, ZESTRIL) 20 mg tablet Take 1 Tab by mouth daily. 30 Tab 2    busPIRone (BUSPAR) 15 mg tablet Take 1 Tab by mouth two (2) times daily as needed. 60 Tab 0    Valproic Acid (DEPAKENE) 250 mg cpDR Take 250 mg by mouth two (2) times a day. 60 Cap 1    traZODone (DESYREL) 100 mg tablet Take 1 Tab by mouth nightly as needed for Sleep. 30 Tab 1    metoprolol succinate (TOPROL-XL) 100 mg XL tablet Take 1 Tab by mouth daily. 90 Tab 1     No current facility-administered medications on file prior to visit.         I affirm this is a Patient Initiated Episode with a Patient who has not had a related appointment within my department in the past 7 days or scheduled within the next 24 hours.     Total Time: minutes: 11-20 minutes    Note: not billable if this call serves to triage the patient into an appointment for the relevant concern      Madhavi Tiwari MD

## 2021-04-09 NOTE — LETTER
4/9/2021    Ms. [address] [facility] [address]      Dear Dary Cohen:    Please find your most recent results below. All these tests were normal.     Resulted Orders   HCG QL SERUM   Result Value Ref Range    HCG, Ql. Negative Negative     HEPATITIS PANEL, ACUTE   Result Value Ref Range    Hepatitis A, IgM NONREACTIVE NONREACTIVE      __          Hepatitis B surface Ag <0.10 Index    Hep B surface Ag Interp. Negative Negative      __          Hepatitis B core, IgM NONREACTIVE NONREACTIVE      __          Hep C virus Ab Interp. NONREACTIVE NONREACTIVE      Hep C  virus Ab comment Method used is Siemens Advia Centaur     RPR   Result Value Ref Range    RPR NONREACTIVE NONREACTIVE     HIV 1/2 AG/AB, 4TH GENERATION,W RFLX CONFIRM   Result Value Ref Range    HIV 1/2 Interpretation NONREACTIVE NONREACTIVE      HIV 1/2 result comment SEE NOTE     TSH 3RD GENERATION   Result Value Ref Range    TSH 2.02 0.36 - 9.51 uIU/mL   METABOLIC PANEL, COMPREHENSIVE   Result Value Ref Range    Sodium 137 136 - 145 mmol/L    Potassium 3.8 3.5 - 5.1 mmol/L    Chloride 104 97 - 108 mmol/L    CO2 27 21 - 32 mmol/L    Anion gap 6 5 - 15 mmol/L    Glucose 86 65 - 100 mg/dL    BUN 14 6 - 20 MG/DL    Creatinine 0.64 0.55 - 1.02 MG/DL    BUN/Creatinine ratio 22 (H) 12 - 20      GFR est AA >60 >60 ml/min/1.73m2    GFR est non-AA >60 >60 ml/min/1.73m2    Calcium 8.8 8.5 - 10.1 MG/DL    Bilirubin, total 0.3 0.2 - 1.0 MG/DL    ALT (SGPT) 21 12 - 78 U/L    AST (SGOT) 20 15 - 37 U/L    Alk.  phosphatase 93 45 - 117 U/L    Protein, total 7.2 6.4 - 8.2 g/dL    Albumin 3.6 3.5 - 5.0 g/dL    Globulin 3.6 2.0 - 4.0 g/dL    A-G Ratio 1.0 (L) 1.1 - 2.2     LIPID PANEL   Result Value Ref Range    LIPID PROFILE          Cholesterol, total 158 <200 MG/DL    Triglyceride 94 <150 MG/DL    HDL Cholesterol 51 MG/DL    LDL, calculated 88.2 0 - 100 MG/DL    VLDL, calculated 18.8 MG/DL    CHOL/HDL Ratio 3.1 0.0 - 5.0     CBC W/O DIFF   Result Value Ref Range    WBC 9.4 3.6 - 11.0 K/uL    RBC 4.47 3.80 - 5.20 M/uL    HGB 12.9 11.5 - 16.0 g/dL    HCT 40.0 35.0 - 47.0 %    MCV 89.5 80.0 - 99.0 FL    MCH 28.9 26.0 - 34.0 PG    MCHC 32.3 30.0 - 36.5 g/dL    RDW 13.6 11.5 - 14.5 %    PLATELET 824 536 - 182 K/uL    MPV 10.1 8.9 - 12.9 FL    NRBC 0.0 0  WBC    ABSOLUTE NRBC 0.00 0.00 - 0.01 K/uL       Sincerely,      Kylie Persaud MD

## 2021-04-12 ENCOUNTER — TELEPHONE (OUTPATIENT)
Dept: FAMILY MEDICINE CLINIC | Age: 46
End: 2021-04-12

## 2021-04-12 NOTE — TELEPHONE ENCOUNTER
Lm for patient. Per Dr. Anderson Blake unable to give letter stating BP ok. Only had virtual visits.

## 2021-04-12 NOTE — TELEPHONE ENCOUNTER
----- Message from Xochilt Tate sent at 4/12/2021 10:02 AM EDT -----  Regarding: /Telephone  General Message/Vendor Calls    Caller's first and last name:self       Reason for call:Pt advised checking on status of paperwork. Callback required yes/no and why:yes, to clarify       Best contact number(s):[phone]      Details to clarify the request:Pt advised dropped off paperwork to office and is wanting to know if she can come pick it up.       Xochilt Tate

## 2021-04-12 NOTE — TELEPHONE ENCOUNTER
----- Message from Sushma Jim sent at 4/12/2021  1:12 PM EDT -----  Regarding: Dr. Traci Magallon  General Message/Vendor Calls    Caller's first and last name: Patient      Reason for call: Patient would like to know if the Blood Pressure Paperwork has been faxed to [employer], patient has called several times with no response she has an appt with them today. pt will pick paperwork up if it has been signed.       Callback required yes/no and why: yes      Best contact number(s):[phone]      Details to clarify the request:      Sushma Jim

## 2021-05-02 ENCOUNTER — HOSPITAL ENCOUNTER (EMERGENCY)
Age: 46
Discharge: HOME | End: 2021-05-02
Admitting: EMERGENCY MEDICINE
Payer: MEDICAID

## 2021-05-02 VITALS
RESPIRATION RATE: 18 BRPM | TEMPERATURE: 98.4 F | BODY MASS INDEX: 26.7 KG/M2 | HEIGHT: 60 IN | OXYGEN SATURATION: 99 % | HEART RATE: 88 BPM | DIASTOLIC BLOOD PRESSURE: 94 MMHG | WEIGHT: 136 LBS | SYSTOLIC BLOOD PRESSURE: 136 MMHG
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
## 2021-05-02 DIAGNOSIS — K13.0 LIP PAIN: ICD-10-CM

## 2021-05-02 PROCEDURE — 99282 EMERGENCY DEPT VISIT SF MDM: CPT

## 2021-05-02 RX ORDER — HYDROCODONE BITARTRATE AND ACETAMINOPHEN 5; 325 MG/1; MG/1
1 TABLET ORAL
Qty: 5 TAB | Refills: 0 | Status: SHIPPED | OUTPATIENT
Start: 2021-05-02 | End: 2021-05-05

## 2021-05-02 RX ORDER — AMOXICILLIN AND CLAVULANATE POTASSIUM 875; 125 MG/1; MG/1
1 TABLET, FILM COATED ORAL 2 TIMES DAILY
Qty: 14 TAB | Refills: 0 | Status: SHIPPED | OUTPATIENT
Start: 2021-05-02 | End: 2021-05-09

## 2021-05-02 NOTE — ED PROVIDER NOTES
EMERGENCY DEPARTMENT HISTORY AND PHYSICAL EXAM      Date: 5/2/2021  Patient Name: Arnulfo Ignacio    History of Presenting Illness     Chief Complaint   Patient presents with    Dog Bite       History Provided By: Patient    HPI: Arnulfo Ignacio, 40 y.o. female with a past medical history significant for bipolar disorder, schizophrenia, multiple personality disorder, hypertension, who presents to the ED with cc of gradually worsening pain and swelling to right lower lip which started around 5 AM status post dog bite. Patient was bit by a dog but she knows. The dog's rabies shots are up-to-date. Patient states that she never gets tetanus shots and does not want one today. States she has been taking ibuprofen 800 mg for the pain, with no relief. Has been applying ice to the area as well. Denies any other injury. There are no other complaints, changes, or physical findings at this time. PCP: Namita Mcnally MD    No current facility-administered medications on file prior to encounter. Current Outpatient Medications on File Prior to Encounter   Medication Sig Dispense Refill    methylphenidate HCl (Ritalin) 20 mg tablet Take 20 mg by mouth three (3) times daily.  ALPRAZolam (Xanax) 1 mg tablet Take 1 mg by mouth.  oxyCODONE-acetaminophen (PERCOCET) 5-325 mg per tablet Take 1-2 Tabs by mouth every four (4) hours as needed for Pain. Max Daily Amount: 12 Tabs. 65 Tab 0    morphine CR (MS CONTIN) 15 mg CR tablet Take 1 Tab by mouth every twelve (12) hours. Max Daily Amount: 30 mg. 10 Tab 0    lisinopril (PRINIVIL, ZESTRIL) 20 mg tablet Take 1 Tab by mouth daily. 30 Tab 2    busPIRone (BUSPAR) 15 mg tablet Take 1 Tab by mouth two (2) times daily as needed. 60 Tab 0    Valproic Acid (DEPAKENE) 250 mg cpDR Take 250 mg by mouth two (2) times a day. 60 Cap 1    traZODone (DESYREL) 100 mg tablet Take 1 Tab by mouth nightly as needed for Sleep.  30 Tab 1    metoprolol succinate (TOPROL-XL) 100 mg XL tablet Take 1 Tab by mouth daily. 80 Tab 1       Past History     Past Medical History:  Past Medical History:   Diagnosis Date    Adverse effect of anesthesia     \"heart kept stopping during the birth of my daughter\"    Anxiety and depression 2015    Bipolar 2 disorder ([facility] 2015    Cancer ([facility]     \"uterine cancer\" treated with cryo    History of bilateral tubal ligation 1/3/2017    History of sexually transmitted disease 2021    Hypertension     [facility] 2015    Noncompliance with medications 2021    Personality disorder ([facility]     multiple personalities    Schizophrenia ([facility] 2015       Past Surgical History:  Past Surgical History:   Procedure Laterality Date    HX ACL RECONSTRUCTION Right     HX GYN      c section x3    HX TUBAL LIGATION         Family History:  Family History   Problem Relation Age of Onset    Stroke Mother    Jo Crouch Bladder Disease Mother     Stroke Father     Heart Disease Brother        Social History:  Social History     Tobacco Use    Smoking status: Former Smoker     Packs/day: 0.50     Years: 30.00     Pack years: 15.00     Quit date: 1/3/2014     Years since quittin.3    Smokeless tobacco: Never Used   Substance Use Topics    Alcohol use: No    Drug use: No       Allergies:  No Known Allergies      Review of Systems     Review of Systems   Constitutional: Negative for chills, fatigue and fever. HENT: Negative. Respiratory: Negative for cough, chest tightness, shortness of breath and wheezing. Cardiovascular: Negative for chest pain and palpitations. Gastrointestinal: Negative for abdominal pain, diarrhea, nausea and vomiting. Genitourinary: Negative for frequency and urgency. Musculoskeletal: Negative for back pain, neck pain and neck stiffness. Skin: Positive for wound. Negative for rash. Neurological: Negative for dizziness, weakness, light-headedness and headaches. Psychiatric/Behavioral: Negative. All other systems reviewed and are negative. Physical Exam     Physical Exam  Vitals signs and nursing note reviewed. Constitutional:       General: She is not in acute distress. Appearance: Normal appearance. She is not ill-appearing or toxic-appearing. HENT:      Head: Normocephalic and atraumatic. Jaw: There is normal jaw occlusion. Nose: Nose normal.      Mouth/Throat:      Mouth: Mucous membranes are moist.      Dentition: Normal dentition. Does not have dentures. No dental tenderness, gingival swelling, dental abscesses or gum lesions. Comments: No loose teeth  Eyes:      General: Lids are normal.      Conjunctiva/sclera:      Right eye: Right conjunctiva is not injected. Left eye: Left conjunctiva is not injected. Neck:      Musculoskeletal: Normal range of motion and neck supple. Cardiovascular:      Rate and Rhythm: Normal rate and regular rhythm. Heart sounds: No murmur. No friction rub. No gallop. Pulmonary:      Effort: Pulmonary effort is normal. No tachypnea or respiratory distress. Breath sounds: Normal breath sounds. No stridor or decreased air movement. Musculoskeletal: Normal range of motion. General: No swelling, tenderness, deformity or signs of injury. Right lower leg: No edema. Left lower leg: No edema. Skin:     General: Skin is warm. Capillary Refill: Capillary refill takes less than 2 seconds. Neurological:      General: No focal deficit present. Mental Status: She is alert and oriented to person, place, and time. Mental status is at baseline. Psychiatric:         Mood and Affect: Mood normal.         Behavior: Behavior is cooperative. Thought Content: Thought content normal.         Judgment: Judgment normal.         Lab and Diagnostic Study Results     Labs -   No results found for this or any previous visit (from the past 12 hour(s)).     Radiologic Studies - none    Medical Decision Making   - I am the first provider for this patient. - I reviewed the vital signs, available nursing notes, past medical history, past surgical history, family history and social history. - Initial assessment performed. The patients presenting problems have been discussed, and they are in agreement with the care plan formulated and outlined with them. I have encouraged them to ask questions as they arise throughout their visit. Vital Signs-Reviewed the patient's vital signs. Patient Vitals for the past 12 hrs:   Temp Pulse Resp BP SpO2   05/02/21 1250 98.4 °F (36.9 °C) 88 18 (!) 136/94 99 %       Records Reviewed: Nursing Notes and Old Medical Records    The patient presents with dog bite with a differential diagnosis of dog bite, laceration, abrasion, dental injury      ED Course:          Provider Notes (Medical Decision Making):     MDM  Number of Diagnoses or Management Options  Dog bite, initial encounter  Lip pain  Diagnosis management comments:     41-year-old female with dog bite to lip. Daily treatment because she thought it would get better at home. No suturable laceration on examination. No severe dental injury. Will start on Augmentin for prophylaxis and discharged home with pain medication. Recommend ice to affected area for symptomatic relief. The dog's rabies shots are up-to-date. Offered tetanus shot which patient adamantly declined several times. Additionally, patient requested resources for homelessness which were provided for her. Return precautions discussed. Amount and/or Complexity of Data Reviewed  Review and summarize past medical records: yes    Patient Progress  Patient progress: stable           Disposition   Disposition: DC- Adult Discharges: All of the diagnostic tests were reviewed and questions answered. Diagnosis, care plan and treatment options were discussed.   The patient understands the instructions and will follow up as directed. The patients results have been reviewed with them. They have been counseled regarding their diagnosis. The patient verbally convey understanding and agreement of the signs, symptoms, diagnosis, treatment and prognosis and additionally agrees to follow up as recommended with their PCP in 24 - 48 hours. They also agree with the care-plan and convey that all of their questions have been answered. I have also put together some discharge instructions for them that include: 1) educational information regarding their diagnosis, 2) how to care for their diagnosis at home, as well a 3) list of reasons why they would want to return to the ED prior to their follow-up appointment, should their condition change. Discharged    DISCHARGE PLAN:  1. Current Discharge Medication List      CONTINUE these medications which have NOT CHANGED    Details   methylphenidate HCl (Ritalin) 20 mg tablet Take 20 mg by mouth three (3) times daily. ALPRAZolam (Xanax) 1 mg tablet Take 1 mg by mouth. oxyCODONE-acetaminophen (PERCOCET) 5-325 mg per tablet Take 1-2 Tabs by mouth every four (4) hours as needed for Pain. Max Daily Amount: 12 Tabs. Qty: 65 Tab, Refills: 0      morphine CR (MS CONTIN) 15 mg CR tablet Take 1 Tab by mouth every twelve (12) hours. Max Daily Amount: 30 mg.  Qty: 10 Tab, Refills: 0      lisinopril (PRINIVIL, ZESTRIL) 20 mg tablet Take 1 Tab by mouth daily. Qty: 30 Tab, Refills: 2    Associated Diagnoses: Essential hypertension      busPIRone (BUSPAR) 15 mg tablet Take 1 Tab by mouth two (2) times daily as needed. Qty: 60 Tab, Refills: 0    Associated Diagnoses: Anxiety and depression      Valproic Acid (DEPAKENE) 250 mg cpDR Take 250 mg by mouth two (2) times a day. Qty: 60 Cap, Refills: 1    Associated Diagnoses: Bipolar 2 disorder ([address] Anxiety and depression; Other schizophrenia ([address]  Multiple personality disorder (HCC)      traZODone (DESYREL) 100 mg tablet Take 1 Tab by mouth nightly as needed for Sleep. Qty: 30 Tab, Refills: 1      metoprolol succinate (TOPROL-XL) 100 mg XL tablet Take 1 Tab by mouth daily. Qty: 90 Tab, Refills: 1    Associated Diagnoses: Essential hypertension           2. Follow-up Information     Follow up With Specialties Details Why Contact Info    Jeffy Goldsmith MD Family Medicine  As needed [address]  MOB [address] [facility]  [place] [address]  [phone]      [facility] EMERGENCY DEPT Emergency Medicine  As needed, If symptoms worsen [address] [place] [address] [phone]        3. Return to ED if worse   4. Current Discharge Medication List      START taking these medications    Details   amoxicillin-clavulanate (Augmentin) 875-125 mg per tablet Take 1 Tab by mouth two (2) times a day for 7 days. Qty: 14 Tab, Refills: 0      HYDROcodone-acetaminophen (NORCO) 5-325 mg per tablet Take 1 Tab by mouth every six (6) hours as needed for Pain for up to 3 days. Max Daily Amount: 4 Tabs. Qty: 5 Tab, Refills: 0    Associated Diagnoses: Dog bite, initial encounter               Diagnosis     Clinical Impression:   1. Dog bite, initial encounter    2. Lip pain        Attestations:    TULIO Tyson    Please note that this dictation was completed with [employer], the [employer] voice recognition software. Quite often unanticipated grammatical, syntax, homophones, and other interpretive errors are inadvertently transcribed by the computer software. Please disregard these errors. Please excuse any errors that have escaped final proofreading. Thank you.

## 2021-05-02 NOTE — ED TRIAGE NOTES
Bitten in face by another dog, reports tooth chipped and bite wound to bottom lip. [place]. Occurred a few hours ago.

## 2021-08-25 ENCOUNTER — HOSPITAL ENCOUNTER (OUTPATIENT)
Dept: LAB | Age: 46
Discharge: HOME | End: 2021-08-25
Payer: MEDICAID

## 2021-08-25 ENCOUNTER — OFFICE VISIT (OUTPATIENT)
Dept: FAMILY MEDICINE CLINIC | Age: 46
End: 2021-08-25
Payer: MEDICAID

## 2021-08-25 VITALS
TEMPERATURE: 96.8 F | DIASTOLIC BLOOD PRESSURE: 88 MMHG | HEART RATE: 98 BPM | OXYGEN SATURATION: 98 % | RESPIRATION RATE: 18 BRPM | HEIGHT: 60 IN | SYSTOLIC BLOOD PRESSURE: 120 MMHG | WEIGHT: 136 LBS | BODY MASS INDEX: 26.7 KG/M2

## 2021-08-25 DIAGNOSIS — N89.8 VAGINAL DISCHARGE: ICD-10-CM

## 2021-08-25 DIAGNOSIS — N76.0 BV (BACTERIAL VAGINOSIS): ICD-10-CM

## 2021-08-25 DIAGNOSIS — N92.6 MISSED PERIOD: ICD-10-CM

## 2021-08-25 DIAGNOSIS — Z01.419 WELL WOMAN EXAM WITH ROUTINE GYNECOLOGICAL EXAM: Primary | ICD-10-CM

## 2021-08-25 DIAGNOSIS — B96.89 BV (BACTERIAL VAGINOSIS): ICD-10-CM

## 2021-08-25 LAB
HCG URINE, QL. (POC): NEGATIVE
VALID INTERNAL CONTROL?: YES

## 2021-08-25 PROCEDURE — S0612 ANNUAL GYNECOLOGICAL EXAMINA: HCPCS | Performed by: FAMILY MEDICINE

## 2021-08-25 PROCEDURE — 81025 URINE PREGNANCY TEST: CPT | Performed by: FAMILY MEDICINE

## 2021-08-25 PROCEDURE — 88175 CYTOPATH C/V AUTO FLUID REDO: CPT

## 2021-08-25 PROCEDURE — 99214 OFFICE O/P EST MOD 30 MIN: CPT | Performed by: FAMILY MEDICINE

## 2021-08-25 RX ORDER — METHYLPHENIDATE HYDROCHLORIDE 20 MG/1
20 TABLET ORAL 3 TIMES DAILY
OUTPATIENT
Start: 2021-08-25

## 2021-08-25 RX ORDER — ALPRAZOLAM 1 MG/1
1 TABLET ORAL
OUTPATIENT
Start: 2021-08-25

## 2021-08-25 RX ORDER — FLUCONAZOLE 150 MG/1
150 TABLET ORAL DAILY
Qty: 1 TABLET | Refills: 0 | Status: SHIPPED | OUTPATIENT
Start: 2021-08-25 | End: 2021-08-26

## 2021-08-25 NOTE — TELEPHONE ENCOUNTER
Patient is wanting a refill on her:methylphenidate HCl (Ritalin) 20 mg tablet;ALPRAZolam (Xanax) 1 mg tablet.   Please call @[phone]

## 2021-08-25 NOTE — PROGRESS NOTES
40 y.o. female for annual routine Pap and checkup. Patient's last menstrual period was 06/07/2021 (approximate). 3rd visit with this physician,   She's moving to [place] [address]. has a past medical history of Adverse effect of anesthesia, Anxiety and depression (11/25/2015), Bipolar 2 disorder ([address] (12/17/2015), Cancer ([address] History of bilateral tubal ligation (1/3/2017), History of sexually transmitted disease (2/18/2021), Hypertension, Multiple personality disorder ([address] (12/17/2015), Noncompliance with medications (2/18/2021), Personality disorder ([address] and Schizophrenia ([address] (12/17/2015). We reviewed labs with her, all annual labs were normal.     Hx of bipolar, schizophrenia, multiple personality disorder, anxiety depression. Not compliant with psych meds  Was seeing psych in [facility]  Only takes xanax 1mg and ritalin 20mg TID. Last take 10/22/2020  She have seen \"3-4 of them, they want to put me on antidepressant but not her ADHD meds\". She's not taking anything. Advised to f/up with psych and work with them. \"I have other medication but I don't take them\".      Referred to psych. Discussed her responsibility to call and get one.      HTN: high normal without taking metoprolol and Lisinopril    We'll leave as is. Social History: multiple partners, contraception - none. Pertinent past medical hstory: no history of HTN, DVT, CAD, DM, liver disease, migraines or smoking.     Patient Active Problem List    Diagnosis Date Noted    Noncompliance with medications 02/18/2021    History of sexually transmitted disease 02/18/2021    Rupture of anterior cruciate ligament of left knee 03/08/2017    History of bilateral tubal ligation 01/03/2017    Bipolar 2 disorder ([address] 12/17/2015    Multiple personality disorder ([address] 12/17/2015    Schizophrenia ([address] 12/17/2015    Anxiety and depression 11/25/2015    Essential hypertension 11/25/2015     Current Outpatient Medications   Medication Sig Dispense Refill    metroNIDAZOLE (FLAGYL) 500 mg tablet Take 1 Tablet by mouth two (2) times a day for 7 days. 14 Tablet 0    methylphenidate HCl (Ritalin) 20 mg tablet Take 20 mg by mouth three (3) times daily. (Patient not taking: Reported on 2021)      ALPRAZolam (Xanax) 1 mg tablet Take 1 mg by mouth. (Patient not taking: Reported on 2021)      oxyCODONE-acetaminophen (PERCOCET) 5-325 mg per tablet Take 1-2 Tabs by mouth every four (4) hours as needed for Pain. Max Daily Amount: 12 Tabs. (Patient not taking: Reported on 2021) 65 Tab 0    morphine CR (MS CONTIN) 15 mg CR tablet Take 1 Tab by mouth every twelve (12) hours. Max Daily Amount: 30 mg. (Patient not taking: Reported on 2021) 10 Tab 0    busPIRone (BUSPAR) 15 mg tablet Take 1 Tab by mouth two (2) times daily as needed. (Patient not taking: Reported on 2021) 60 Tab 0    Valproic Acid (DEPAKENE) 250 mg cpDR Take 250 mg by mouth two (2) times a day. (Patient not taking: Reported on 2021) 60 Cap 1    traZODone (DESYREL) 100 mg tablet Take 1 Tab by mouth nightly as needed for Sleep. (Patient not taking: Reported on 2021) 30 Tab 1     No Known Allergies  Past Surgical History:   Procedure Laterality Date    HX ACL RECONSTRUCTION Right     HX GYN      c section x3    HX TUBAL LIGATION       Family History   Problem Relation Age of Onset    Stroke Mother    Malva Safer Bladder Disease Mother     Stroke Father     Heart Disease Brother      Social History     Tobacco Use    Smoking status: Former Smoker     Packs/day: 0.50     Years: 30.00     Pack years: 15.00     Quit date: 1/3/2014     Years since quittin.6    Smokeless tobacco: Never Used   Substance Use Topics    Alcohol use: No        ROS:  Feeling well. No dyspnea or chest pain on exertion. No abdominal pain, change in bowel habits, black or bloody stools. No urinary tract symptoms.  GYN ROS: normal menses, no abnormal bleeding, pelvic pain or discharge, no breast pain or new or enlarging lumps on self exam. No neurological complaints. Objective:     Visit Vitals  /88   Pulse 98   Temp 96.8 °F (36 °C) (Temporal)   Resp 18   Ht 5' (1.524 m)   Wt 136 lb (61.7 kg)   LMP 06/07/2021 (Approximate)   SpO2 98%   BMI 26.56 kg/m²     General:  Alert, cooperative, no distress, appears stated age. Head:  Normocephalic, without obvious abnormality, atraumatic. Eyes:  Conjunctivae/corneas clear. PERRL, EOMs intact. Neck: Supple, symmetrical, trachea midline, no carotid bruit and no JVD. Back:   Symmetric, no curvature. ROM normal. No CVA tenderness. Lungs:   Clear to auscultation bilaterally. Heart:  Regular rate and rhythm, S1, S2 normal, no murmur, click, rub or gallop. Abdomen:   Soft, non-tender. Extremities: Extremities normal, atraumatic, no cyanosis or edema. Pulses: 2+ and symmetric all extremities. Neurologic: CNII-XII intact. Normal strength, sensation and reflexes throughout. BREAST EXAM: breasts appear normal, no suspicious masses, no skin or nipple changes or axillary nodes    PELVIC EXAM: normal external genitalia, vulva, vagina, cervix, uterus and adnexa      Assessment/Plan:     Diagnoses and all orders for this visit:    1. Well woman exam with routine gynecological exam  -     PAP IG, RFX APTIMA HPV ASCUS ([id number]); Future    2. Missed period  -     AMB POC URINE PREGNANCY TEST, VISUAL COLOR COMPARISON    3. Vaginal discharge  -     NUSWAB VAGINITIS PLUS; Future  -     fluconazole (DIFLUCAN) 150 mg tablet; Take 1 Tablet by mouth daily for 1 day. FDA advises cautious prescribing of oral fluconazole in pregnancy. -     metroNIDAZOLE (FLAGYL) 500 mg tablet; Take 1 Tablet by mouth two (2) times a day for 7 days. 4. BV (bacterial vaginosis)  -     metroNIDAZOLE (FLAGYL) 500 mg tablet; Take 1 Tablet by mouth two (2) times a day for 7 days.       Follow-up and Dispositions    · Return in about 9 months (around 5/25/2022) for annual exam.         Gregorio Rodríguez MD  8/31/2021

## 2021-08-25 NOTE — PROGRESS NOTES
Chief Complaint   Patient presents with    Complete Physical       1. Have you been to the ER, urgent care clinic since your last visit? Hospitalized since your last visit? No     2. Have you seen or consulted any other health care providers outside of the [address] [place] [address] since your last visit? Include any pap smears or colon screening.  No

## 2021-08-28 LAB
A VAGINAE DNA VAG QL NAA+PROBE: ABNORMAL SCORE
BVAB2 DNA VAG QL NAA+PROBE: ABNORMAL SCORE
C ALBICANS DNA VAG QL NAA+PROBE: NEGATIVE
C GLABRATA DNA VAG QL NAA+PROBE: NEGATIVE
C TRACH RRNA SPEC QL NAA+PROBE: NEGATIVE
MEGA1 DNA VAG QL NAA+PROBE: ABNORMAL SCORE
N GONORRHOEA RRNA SPEC QL NAA+PROBE: NEGATIVE
SPECIMEN SOURCE: ABNORMAL
T VAGINALIS RRNA SPEC QL NAA+PROBE: NEGATIVE

## 2021-08-31 RX ORDER — METRONIDAZOLE 500 MG/1
500 TABLET ORAL 2 TIMES DAILY
Qty: 14 TABLET | Refills: 0 | Status: SHIPPED | OUTPATIENT
Start: 2021-08-31 | End: 2021-09-07

## 2021-12-13 ENCOUNTER — HOSPITAL ENCOUNTER (EMERGENCY)
Age: 46
Discharge: HOME | End: 2021-12-13
Attending: EMERGENCY MEDICINE
Payer: MEDICAID

## 2021-12-13 ENCOUNTER — APPOINTMENT (OUTPATIENT)
Dept: ULTRASOUND IMAGING | Age: 46
End: 2021-12-13
Attending: PHYSICIAN ASSISTANT
Payer: MEDICAID

## 2021-12-13 VITALS
DIASTOLIC BLOOD PRESSURE: 111 MMHG | TEMPERATURE: 98 F | HEIGHT: 60 IN | WEIGHT: 138.01 LBS | BODY MASS INDEX: 27.09 KG/M2 | OXYGEN SATURATION: 95 % | HEART RATE: 83 BPM | RESPIRATION RATE: 16 BRPM | SYSTOLIC BLOOD PRESSURE: 154 MMHG

## 2021-12-13 DIAGNOSIS — N93.9 VAGINAL BLEEDING: Primary | ICD-10-CM

## 2021-12-13 LAB
ALBUMIN SERPL-MCNC: 3.4 G/DL (ref 3.5–5)
ALBUMIN/GLOB SERPL: 0.9 {RATIO} (ref 1.1–2.2)
ALP SERPL-CCNC: 84 U/L (ref 45–117)
ALT SERPL-CCNC: 17 U/L (ref 12–78)
ANION GAP SERPL CALC-SCNC: 4 MMOL/L (ref 5–15)
APPEARANCE UR: CLEAR
AST SERPL-CCNC: 18 U/L (ref 15–37)
BACTERIA URNS QL MICRO: NEGATIVE /HPF
BASOPHILS # BLD: 0.1 K/UL (ref 0–0.1)
BASOPHILS NFR BLD: 1 % (ref 0–1)
BILIRUB SERPL-MCNC: 0.3 MG/DL (ref 0.2–1)
BILIRUB UR QL: NEGATIVE
BUN SERPL-MCNC: 8 MG/DL (ref 6–20)
BUN/CREAT SERPL: 10 (ref 12–20)
CALCIUM SERPL-MCNC: 9 MG/DL (ref 8.5–10.1)
CHLORIDE SERPL-SCNC: 107 MMOL/L (ref 97–108)
CO2 SERPL-SCNC: 29 MMOL/L (ref 21–32)
COLOR UR: ABNORMAL
CREAT SERPL-MCNC: 0.83 MG/DL (ref 0.55–1.02)
DIFFERENTIAL METHOD BLD: NORMAL
EOSINOPHIL # BLD: 0.1 K/UL (ref 0–0.4)
EOSINOPHIL NFR BLD: 1 % (ref 0–7)
EPITH CASTS URNS QL MICRO: ABNORMAL /LPF
ERYTHROCYTE [DISTWIDTH] IN BLOOD BY AUTOMATED COUNT: 13.5 % (ref 11.5–14.5)
GLOBULIN SER CALC-MCNC: 3.9 G/DL (ref 2–4)
GLUCOSE SERPL-MCNC: 83 MG/DL (ref 65–100)
GLUCOSE UR STRIP.AUTO-MCNC: NEGATIVE MG/DL
HCG UR QL: NEGATIVE
HCT VFR BLD AUTO: 42.4 % (ref 35–47)
HGB BLD-MCNC: 14 G/DL (ref 11.5–16)
HGB UR QL STRIP: ABNORMAL
IMM GRANULOCYTES # BLD AUTO: 0 K/UL (ref 0–0.04)
IMM GRANULOCYTES NFR BLD AUTO: 0 % (ref 0–0.5)
KETONES UR QL STRIP.AUTO: NEGATIVE MG/DL
LEUKOCYTE ESTERASE UR QL STRIP.AUTO: NEGATIVE
LYMPHOCYTES # BLD: 2 K/UL (ref 0.8–3.5)
LYMPHOCYTES NFR BLD: 19 % (ref 12–49)
MCH RBC QN AUTO: 30 PG (ref 26–34)
MCHC RBC AUTO-ENTMCNC: 33 G/DL (ref 30–36.5)
MCV RBC AUTO: 90.8 FL (ref 80–99)
MONOCYTES # BLD: 0.8 K/UL (ref 0–1)
MONOCYTES NFR BLD: 7 % (ref 5–13)
NEUTS SEG # BLD: 7.5 K/UL (ref 1.8–8)
NEUTS SEG NFR BLD: 72 % (ref 32–75)
NITRITE UR QL STRIP.AUTO: NEGATIVE
NRBC # BLD: 0 K/UL (ref 0–0.01)
NRBC BLD-RTO: 0 PER 100 WBC
PH UR STRIP: 6 [PH] (ref 5–8)
PLATELET # BLD AUTO: 260 K/UL (ref 150–400)
PMV BLD AUTO: 9.3 FL (ref 8.9–12.9)
POTASSIUM SERPL-SCNC: 3.5 MMOL/L (ref 3.5–5.1)
PROT SERPL-MCNC: 7.3 G/DL (ref 6.4–8.2)
PROT UR STRIP-MCNC: NEGATIVE MG/DL
RBC # BLD AUTO: 4.67 M/UL (ref 3.8–5.2)
RBC #/AREA URNS HPF: ABNORMAL /HPF (ref 0–5)
SODIUM SERPL-SCNC: 140 MMOL/L (ref 136–145)
SP GR UR REFRACTOMETRY: 1.02 (ref 1–1.03)
UA: UC IF INDICATED,UAUC: ABNORMAL
UROBILINOGEN UR QL STRIP.AUTO: 0.2 EU/DL (ref 0.2–1)
WBC # BLD AUTO: 10.4 K/UL (ref 3.6–11)
WBC URNS QL MICRO: ABNORMAL /HPF (ref 0–4)

## 2021-12-13 PROCEDURE — 74011250636 HC RX REV CODE- 250/636: Performed by: PHYSICIAN ASSISTANT

## 2021-12-13 PROCEDURE — 80053 COMPREHEN METABOLIC PANEL: CPT

## 2021-12-13 PROCEDURE — 81001 URINALYSIS AUTO W/SCOPE: CPT

## 2021-12-13 PROCEDURE — 76830 TRANSVAGINAL US NON-OB: CPT

## 2021-12-13 PROCEDURE — 81025 URINE PREGNANCY TEST: CPT

## 2021-12-13 PROCEDURE — 36415 COLL VENOUS BLD VENIPUNCTURE: CPT

## 2021-12-13 PROCEDURE — 96375 TX/PRO/DX INJ NEW DRUG ADDON: CPT

## 2021-12-13 PROCEDURE — 99282 EMERGENCY DEPT VISIT SF MDM: CPT

## 2021-12-13 PROCEDURE — 85025 COMPLETE CBC W/AUTO DIFF WBC: CPT

## 2021-12-13 PROCEDURE — 96374 THER/PROPH/DIAG INJ IV PUSH: CPT

## 2021-12-13 RX ORDER — ONDANSETRON 4 MG/1
4 TABLET, ORALLY DISINTEGRATING ORAL
Qty: 20 TABLET | Refills: 0 | Status: SHIPPED | OUTPATIENT
Start: 2021-12-13

## 2021-12-13 RX ORDER — ONDANSETRON 2 MG/ML
4 INJECTION INTRAMUSCULAR; INTRAVENOUS
Status: COMPLETED | OUTPATIENT
Start: 2021-12-13 | End: 2021-12-13

## 2021-12-13 RX ORDER — NAPROXEN 500 MG/1
500 TABLET ORAL 2 TIMES DAILY WITH MEALS
Qty: 20 TABLET | Refills: 0 | Status: SHIPPED | OUTPATIENT
Start: 2021-12-13 | End: 2021-12-13 | Stop reason: SDUPTHER

## 2021-12-13 RX ORDER — KETOROLAC TROMETHAMINE 30 MG/ML
15 INJECTION, SOLUTION INTRAMUSCULAR; INTRAVENOUS
Status: COMPLETED | OUTPATIENT
Start: 2021-12-13 | End: 2021-12-13

## 2021-12-13 RX ORDER — NAPROXEN 500 MG/1
500 TABLET ORAL 2 TIMES DAILY WITH MEALS
Qty: 20 TABLET | Refills: 0 | Status: SHIPPED | OUTPATIENT
Start: 2021-12-13 | End: 2021-12-23

## 2021-12-13 RX ADMIN — KETOROLAC TROMETHAMINE 15 MG: 30 INJECTION, SOLUTION INTRAMUSCULAR at 20:55

## 2021-12-13 RX ADMIN — ONDANSETRON 4 MG: 2 INJECTION INTRAMUSCULAR; INTRAVENOUS at 20:55

## 2021-12-14 NOTE — ED NOTES
Discharge instructions given to patient by Sharla Holstein, RN. Verbalized understanding of instructions. Patient discharged without difficulty. Patient discharged in stable condition via ambulation accompanied by family.

## 2021-12-14 NOTE — ED PROVIDER NOTES
EMERGENCY DEPARTMENT HISTORY AND PHYSICAL EXAM      Date: 12/13/2021  Patient Name: Gopi Ernst    History of Presenting Illness     Chief Complaint   Patient presents with    Vaginal Bleeding     Patient arrives stating that she \"thinks she was pregnant\" and began cramping and started vaginal bleeding. Patient states that her last period was approximately 5 months ago. History Provided By: Patient    HPI: Gopi Ernst, 40 y.o. female with PMHx significant for problems listed below, presents to the ED with cc of vaginal bleeding. The patient reports that her last menstrual cycle was 5 months ago. She thought she was going through menopause but saw movement in her abdomen a few days ago and thought that she may be pregnant. She had a negative pregnancy test at home. Today, she began having pelvic cramping and vaginal bleeding. The vaginal bleeding has mostly resolved at this point but she continues to have cramping. She has had associated nausea and a few episodes of vomiting over the last few days. She denies fevers, vaginal discharge, dysuria. There are no other complaints, changes, or physical findings at this time. PCP: Emmy Lopez MD    No current facility-administered medications on file prior to encounter. Current Outpatient Medications on File Prior to Encounter   Medication Sig Dispense Refill    methylphenidate HCl (Ritalin) 20 mg tablet Take 20 mg by mouth three (3) times daily. (Patient not taking: Reported on 8/25/2021)      ALPRAZolam (Xanax) 1 mg tablet Take 1 mg by mouth. (Patient not taking: Reported on 8/25/2021)      oxyCODONE-acetaminophen (PERCOCET) 5-325 mg per tablet Take 1-2 Tabs by mouth every four (4) hours as needed for Pain. Max Daily Amount: 12 Tabs. (Patient not taking: Reported on 8/25/2021) 65 Tab 0    morphine CR (MS CONTIN) 15 mg CR tablet Take 1 Tab by mouth every twelve (12) hours. Max Daily Amount: 30 mg.  (Patient not taking: Reported on 2021) 10 Tab 0    busPIRone (BUSPAR) 15 mg tablet Take 1 Tab by mouth two (2) times daily as needed. (Patient not taking: Reported on 2021) 60 Tab 0    Valproic Acid (DEPAKENE) 250 mg cpDR Take 250 mg by mouth two (2) times a day. (Patient not taking: Reported on 2021) 60 Cap 1    traZODone (DESYREL) 100 mg tablet Take 1 Tab by mouth nightly as needed for Sleep. (Patient not taking: Reported on 2021) 30 Tab 1       Past History     Past Medical History:  Past Medical History:   Diagnosis Date    Adverse effect of anesthesia     \"heart kept stopping during the birth of my daughter\"    Anxiety and depression 2015    Bipolar 2 disorder ([facility] 2015    Cancer ([facility]     \"uterine cancer\" treated with cryo    History of bilateral tubal ligation 1/3/2017    History of sexually transmitted disease 2021    Hypertension     Multiple personality disorder ([facility] 2015    Noncompliance with medications 2021    Personality disorder ([address]     multiple personalities    Schizophrenia ([facility] [facility] 2015       Past Surgical History:  Past Surgical History:   Procedure Laterality Date    HX ACL RECONSTRUCTION Right     HX GYN      c section x3    HX TUBAL LIGATION         Family History:  Family History   Problem Relation Age of Onset    Stroke Mother    Logan Henry Bladder Disease Mother     Stroke Father     Heart Disease Brother        Social History:  Social History     Tobacco Use    Smoking status: Former Smoker     Packs/day: 0.50     Years: 30.00     Pack years: 15.00     Quit date: 1/3/2014     Years since quittin.9    Smokeless tobacco: Never Used   Substance Use Topics    Alcohol use: No    Drug use: No       Allergies:  No Known Allergies      Review of Systems   Review of Systems   Constitutional: Negative for chills and fever. HENT: Negative for ear pain and sore throat. Eyes: Negative for redness and visual disturbance.    Respiratory: Negative for cough and shortness of breath. Cardiovascular: Negative for chest pain and palpitations. Gastrointestinal: Negative for abdominal pain, nausea and vomiting. Genitourinary: Positive for pelvic pain and vaginal bleeding. Negative for dysuria, hematuria, vaginal discharge and vaginal pain. Musculoskeletal: Negative for back pain and gait problem. Skin: Negative for rash and wound. Neurological: Negative for dizziness and headaches. Psychiatric/Behavioral: Negative for behavioral problems and confusion. All other systems reviewed and are negative. Physical Exam   Physical Exam  Constitutional:       Appearance: She is not toxic-appearing. HENT:      Head: Normocephalic and atraumatic. Mouth/Throat:      Mouth: Mucous membranes are moist.   Eyes:      Extraocular Movements: Extraocular movements intact. Pupils: Pupils are equal, round, and reactive to light. Cardiovascular:      Rate and Rhythm: Normal rate and regular rhythm. Pulmonary:      Effort: Pulmonary effort is normal. No respiratory distress. Abdominal:      Comments: Abdomen is flat and soft. No reproducible tenderness to palpation. No rebound or guarding. Genitourinary:     Comments: Deferred. Musculoskeletal:         General: No deformity. Normal range of motion. Cervical back: Normal range of motion and neck supple. Skin:     General: Skin is warm and dry. Neurological:      General: No focal deficit present. Mental Status: She is alert and oriented to person, place, and time.    Psychiatric:         Behavior: Behavior normal.           Diagnostic Study Results     Labs -     Recent Results (from the past 12 hour(s))   URINALYSIS W/ REFLEX CULTURE    Collection Time: 12/13/21  7:27 PM    Specimen: Urine   Result Value Ref Range    Color YELLOW/STRAW      Appearance CLEAR CLEAR      Specific gravity 1.025 1.003 - 1.030      pH (UA) 6.0 5.0 - 8.0      Protein Negative NEG mg/dL    Glucose Negative NEG mg/dL    Ketone Negative NEG mg/dL    Bilirubin Negative NEG      Blood TRACE (A) NEG      Urobilinogen 0.2 0.2 - 1.0 EU/dL    Nitrites Negative NEG      Leukocyte Esterase Negative NEG      WBC 0-4 0 - 4 /hpf    RBC 0-5 0 - 5 /hpf    Epithelial cells FEW FEW /lpf    Bacteria Negative NEG /hpf    UA:UC IF INDICATED CULTURE NOT INDICATED BY UA RESULT CNI     CBC WITH AUTOMATED DIFF    Collection Time: 12/13/21  7:30 PM   Result Value Ref Range    WBC 10.4 3.6 - 11.0 K/uL    RBC 4.67 3.80 - 5.20 M/uL    HGB 14.0 11.5 - 16.0 g/dL    HCT 42.4 35.0 - 47.0 %    MCV 90.8 80.0 - 99.0 FL    MCH 30.0 26.0 - 34.0 PG    MCHC 33.0 30.0 - 36.5 g/dL    RDW 13.5 11.5 - 14.5 %    PLATELET 072 383 - 997 K/uL    MPV 9.3 8.9 - 12.9 FL    NRBC 0.0 0  WBC    ABSOLUTE NRBC 0.00 0.00 - 0.01 K/uL    NEUTROPHILS 72 32 - 75 %    LYMPHOCYTES 19 12 - 49 %    MONOCYTES 7 5 - 13 %    EOSINOPHILS 1 0 - 7 %    BASOPHILS 1 0 - 1 %    IMMATURE GRANULOCYTES 0 0.0 - 0.5 %    ABS. NEUTROPHILS 7.5 1.8 - 8.0 K/UL    ABS. LYMPHOCYTES 2.0 0.8 - 3.5 K/UL    ABS. MONOCYTES 0.8 0.0 - 1.0 K/UL    ABS. EOSINOPHILS 0.1 0.0 - 0.4 K/UL    ABS. BASOPHILS 0.1 0.0 - 0.1 K/UL    ABS. IMM. GRANS. 0.0 0.00 - 0.04 K/UL    DF AUTOMATED     METABOLIC PANEL, COMPREHENSIVE    Collection Time: 12/13/21  7:30 PM   Result Value Ref Range    Sodium 140 136 - 145 mmol/L    Potassium 3.5 3.5 - 5.1 mmol/L    Chloride 107 97 - 108 mmol/L    CO2 29 21 - 32 mmol/L    Anion gap 4 (L) 5 - 15 mmol/L    Glucose 83 65 - 100 mg/dL    BUN 8 6 - 20 MG/DL    Creatinine 0.83 0.55 - 1.02 MG/DL    BUN/Creatinine ratio 10 (L) 12 - 20      GFR est AA >60 >60 ml/min/1.73m2    GFR est non-AA >60 >60 ml/min/1.73m2    Calcium 9.0 8.5 - 10.1 MG/DL    Bilirubin, total 0.3 0.2 - 1.0 MG/DL    ALT (SGPT) 17 12 - 78 U/L    AST (SGOT) 18 15 - 37 U/L    Alk.  phosphatase 84 45 - 117 U/L    Protein, total 7.3 6.4 - 8.2 g/dL    Albumin 3.4 (L) 3.5 - 5.0 g/dL    Globulin 3.9 2.0 - 4.0 g/dL    A-G Ratio 0.9 (L) 1.1 - 2.2     HCG URINE, QL. - POC    Collection Time: 12/13/21  7:31 PM   Result Value Ref Range    Pregnancy test,urine (POC) Negative NEG         Radiologic Studies -   US TRANSVAGINAL   Final Result   Normal appearance of the uterus and ovaries. CT Results  (Last 48 hours)    None        CXR Results  (Last 48 hours)    None            Medical Decision Making   I am the first provider for this patient. I reviewed the vital signs, available nursing notes, past medical history, past surgical history, family history and social history. Vital Signs-Reviewed the patient's vital signs. Patient Vitals for the past 12 hrs:   Temp Pulse Resp BP SpO2   12/13/21 2142  83   95 %   12/13/21 1916 98 °F (36.7 °C) (!) 112 16 (!) 154/111 99 %         Records Reviewed: Nursing Notes and Old Medical Records      Provider Notes (Medical Decision Making):   DDx: Menopause, ovarian cyst, miscarriage, UTI    Patient presents with pelvic cramping and vaginal bleeding. Pregnancy test is negative. Urinalysis shows no signs of infection. Labs are otherwise unremarkable. Transvaginal ultrasound shows no acute process. Advise follow-up with PCP and/or OB/GYN for recheck. Discussed return precautions. ED Course:   Initial assessment performed. The patients presenting problems have been discussed, and they are in agreement with the care plan formulated and outlined with them. I have encouraged them to ask questions as they arise throughout their visit. Disposition:  9:43 PM  The patient has been re-evaluated and is ready for discharge. Reviewed available results with patient. Counseled patient on diagnosis and care plan. Patient has expressed understanding, and all questions have been answered. Patient agrees with plan and agrees to follow up as recommended, or to return to the ED if their symptoms worsen.  Discharge instructions have been provided and explained to the patient, along with reasons to return to the ED. PLAN:  1. Discharge Medication List as of 12/13/2021  9:43 PM      START taking these medications    Details   naproxen (Naprosyn) 500 mg tablet Take 1 Tablet by mouth two (2) times daily (with meals) for 10 days. , Normal, Disp-20 Tablet, R-0         CONTINUE these medications which have NOT CHANGED    Details   methylphenidate HCl (Ritalin) 20 mg tablet Take 20 mg by mouth three (3) times daily. , Historical Med      ALPRAZolam (Xanax) 1 mg tablet Take 1 mg by mouth., Historical Med      oxyCODONE-acetaminophen (PERCOCET) 5-325 mg per tablet Take 1-2 Tabs by mouth every four (4) hours as needed for Pain. Max Daily Amount: 12 Tabs., Print, Disp-65 Tab, R-0      morphine CR (MS CONTIN) 15 mg CR tablet Take 1 Tab by mouth every twelve (12) hours. Max Daily Amount: 30 mg., Print, Disp-10 Tab, R-0      busPIRone (BUSPAR) 15 mg tablet Take 1 Tab by mouth two (2) times daily as needed. , Print, Disp-60 Tab, R-0      Valproic Acid (DEPAKENE) 250 mg cpDR Take 250 mg by mouth two (2) times a day., Normal, Disp-60 Cap, R-1      traZODone (DESYREL) 100 mg tablet Take 1 Tab by mouth nightly as needed for Sleep., Normal, Disp-30 Tab, R-1           2. Follow-up Information     Follow up With Specialties Details Why Contact Info    Kristal Choudhury MD [facility] Medicine Schedule an appointment as soon as possible for a visit in 1 week  . [address]  [facility] [address] [place] [address]  [phone]      [facility] EMERGENCY DEPT Emergency Medicine Go to  If symptoms worsen [address] [place] [address]  [phone]        Return to ED if worse     Diagnosis     Clinical Impression:   1. Vaginal bleeding            Clementina Mc.  FERNANDA Velasquez

## 2021-12-16 ENCOUNTER — TELEPHONE (OUTPATIENT)
Dept: FAMILY MEDICINE CLINIC | Age: 46
End: 2021-12-16

## 2021-12-17 ENCOUNTER — VIRTUAL VISIT (OUTPATIENT)
Dept: FAMILY MEDICINE CLINIC | Age: 46
End: 2021-12-17
Payer: MEDICAID

## 2021-12-17 DIAGNOSIS — H81.10 BENIGN PAROXYSMAL POSITIONAL VERTIGO, UNSPECIFIED LATERALITY: Primary | ICD-10-CM

## 2021-12-17 DIAGNOSIS — G43.C0 PERIODIC HEADACHE SYNDROME, NOT INTRACTABLE: ICD-10-CM

## 2021-12-17 PROCEDURE — 99442 PR PHYS/QHP TELEPHONE EVALUATION 11-20 MIN: CPT | Performed by: FAMILY MEDICINE

## 2021-12-17 RX ORDER — SUMATRIPTAN 50 MG/1
50 TABLET, FILM COATED ORAL
Qty: 15 TABLET | Refills: 0 | Status: SHIPPED | OUTPATIENT
Start: 2021-12-17 | End: 2021-12-17

## 2021-12-17 RX ORDER — ATOMOXETINE 25 MG/1
CAPSULE ORAL
COMMUNITY
Start: 2021-11-24

## 2021-12-17 RX ORDER — MECLIZINE HYDROCHLORIDE 25 MG/1
25 TABLET ORAL
Qty: 30 TABLET | Refills: 0 | Status: SHIPPED | OUTPATIENT
Start: 2021-12-17 | End: 2021-12-27

## 2021-12-17 NOTE — PROGRESS NOTES
Chief Complaint   Patient presents with    Dizziness       1. Have you been to the ER, urgent care clinic since your last visit? Hospitalized since your last visit? Yes     2. Have you seen or consulted any other health care providers outside of the [address] [place] [address] since your last visit? Include any pap smears or colon screening.  No

## 2021-12-17 NOTE — PROGRESS NOTES
Gopi Ernst is a 40 y.o. female evaluated via telephone on 12/17/2021. Consent:  She and/or health care decision maker is aware that she may receive a bill for this telephone service, depending on her insurance coverage, and has provided verbal consent to proceed: Yes      Documentation:  I communicated with the patient and/or health care decision maker about;   Details of this discussion including any medical advice provided:     4th visit with this physician,   She's moving to [facility]. has a past medical history of Adverse effect of anesthesia, Anxiety and depression (11/25/2015), Bipolar 2 disorder ([facility] (12/17/2015), Cancer ([facility] [address] History of bilateral tubal ligation (1/3/2017), History of sexually transmitted disease (2/18/2021), Hypertension, Multiple personality disorder ([facility] [address] (12/17/2015), Noncompliance with medications (2/18/2021), Personality disorder ([facility] [address] and Schizophrenia ([facility] (12/17/2015). 2 ED visits since our last apt. Most recent ED 12/13//21 for vaginal bleeding. Labs nega HCG  Bleeding has stop  Her abdo cramps is resolving. But now she feels dizziness, room spinning with head movements. Improved w keeping head still. Denies CP. Also now having a \"migraine HA\" past day. She haven't taken anything. Denies focal neurological deficit       Hx of bipolar, schizophrenia, multiple personality disorder, anxiety depression.  Not compliant with psych meds  Is now seeing psych     HTN: high normal without taking metoprolol and Lisinopril              We'll leave as is.        Diagnoses and all orders for this visit:    1. Benign paroxysmal positional vertigo, unspecified laterality  -     meclizine (ANTIVERT) 25 mg tablet; Take 1 Tablet by mouth three (3) times daily as needed for Dizziness for up to 10 days. 2. Periodic headache syndrome, not intractable  -     SUMAtriptan (IMITREX) 50 mg tablet;  Take 1 Tablet by mouth once as needed for Migraine for up to 1 dose.      Follow-up and Dispositions    · Return in about 3 days (around 12/20/2021), or if symptoms worsen or fail to improve, for to ED. Past Medical History:   Diagnosis Date    Adverse effect of anesthesia     \"heart kept stopping during the birth of my daughter\"    Anxiety and depression 11/25/2015    Bipolar 2 disorder ([facility] 12/17/2015    Cancer ([facility]     \"uterine cancer\" treated with cryo    History of bilateral tubal ligation 1/3/2017    History of sexually transmitted disease 2/18/2021    Hypertension     Multiple personality disorder ([facility] 12/17/2015    Noncompliance with medications 2/18/2021    Personality disorder ([facility]     multiple personalities    Schizophrenia ([facility] 12/17/2015       Current Outpatient Medications on File Prior to Visit   Medication Sig Dispense Refill    atomoxetine (STRATTERA) 25 mg capsule  (Patient not taking: Reported on 12/17/2021)      naproxen (Naprosyn) 500 mg tablet Take 1 Tablet by mouth two (2) times daily (with meals) for 10 days. (Patient not taking: Reported on 12/17/2021) 20 Tablet 0    ondansetron (ZOFRAN ODT) 4 mg disintegrating tablet Take 1 Tablet by mouth every eight (8) hours as needed for Nausea or Vomiting. (Patient not taking: Reported on 12/17/2021) 20 Tablet 0    methylphenidate HCl (Ritalin) 20 mg tablet Take 20 mg by mouth three (3) times daily. (Patient not taking: Reported on 8/25/2021)      ALPRAZolam (Xanax) 1 mg tablet Take 1 mg by mouth. (Patient not taking: Reported on 8/25/2021)      oxyCODONE-acetaminophen (PERCOCET) 5-325 mg per tablet Take 1-2 Tabs by mouth every four (4) hours as needed for Pain. Max Daily Amount: 12 Tabs. (Patient not taking: Reported on 8/25/2021) 65 Tab 0    morphine CR (MS CONTIN) 15 mg CR tablet Take 1 Tab by mouth every twelve (12) hours. Max Daily Amount: 30 mg.  (Patient not taking: Reported on 8/25/2021) 10 Tab 0    busPIRone (BUSPAR) 15 mg tablet Take 1 Tab by mouth two (2) times daily as needed. (Patient not taking: Reported on 8/25/2021) 60 Tab 0    Valproic Acid (DEPAKENE) 250 mg cpDR Take 250 mg by mouth two (2) times a day. (Patient not taking: Reported on 8/25/2021) 60 Cap 1    traZODone (DESYREL) 100 mg tablet Take 1 Tab by mouth nightly as needed for Sleep. (Patient not taking: Reported on 8/25/2021) 30 Tab 1     No current facility-administered medications on file prior to visit. I affirm this is a Patient Initiated Episode with a Patient who has not had a related appointment within my department in the past 7 days or scheduled within the next 24 hours.     Total Time: minutes: 11-20 minutes    Note: not billable if this call serves to triage the patient into an appointment for the relevant concern      Shaina Liu MD

## 2021-12-20 ENCOUNTER — HOSPITAL ENCOUNTER (EMERGENCY)
Age: 46
Discharge: HOME | End: 2021-12-20
Attending: STUDENT IN AN ORGANIZED HEALTH CARE EDUCATION/TRAINING PROGRAM | Admitting: STUDENT IN AN ORGANIZED HEALTH CARE EDUCATION/TRAINING PROGRAM
Payer: MEDICAID

## 2021-12-20 VITALS
HEART RATE: 98 BPM | DIASTOLIC BLOOD PRESSURE: 113 MMHG | WEIGHT: 135.36 LBS | HEIGHT: 60 IN | SYSTOLIC BLOOD PRESSURE: 165 MMHG | RESPIRATION RATE: 18 BRPM | OXYGEN SATURATION: 100 % | TEMPERATURE: 98.4 F | BODY MASS INDEX: 26.58 KG/M2

## 2021-12-20 DIAGNOSIS — R51.9 ACUTE NONINTRACTABLE HEADACHE, UNSPECIFIED HEADACHE TYPE: Primary | ICD-10-CM

## 2021-12-20 PROCEDURE — 99282 EMERGENCY DEPT VISIT SF MDM: CPT

## 2021-12-20 PROCEDURE — 96375 TX/PRO/DX INJ NEW DRUG ADDON: CPT

## 2021-12-20 PROCEDURE — 96374 THER/PROPH/DIAG INJ IV PUSH: CPT

## 2021-12-20 PROCEDURE — 74011250636 HC RX REV CODE- 250/636: Performed by: STUDENT IN AN ORGANIZED HEALTH CARE EDUCATION/TRAINING PROGRAM

## 2021-12-20 PROCEDURE — 74011250637 HC RX REV CODE- 250/637: Performed by: STUDENT IN AN ORGANIZED HEALTH CARE EDUCATION/TRAINING PROGRAM

## 2021-12-20 RX ORDER — PROCHLORPERAZINE EDISYLATE 5 MG/ML
10 INJECTION INTRAMUSCULAR; INTRAVENOUS
Status: COMPLETED | OUTPATIENT
Start: 2021-12-20 | End: 2021-12-20

## 2021-12-20 RX ORDER — DIPHENHYDRAMINE HYDROCHLORIDE 50 MG/ML
25 INJECTION, SOLUTION INTRAMUSCULAR; INTRAVENOUS
Status: COMPLETED | OUTPATIENT
Start: 2021-12-20 | End: 2021-12-20

## 2021-12-20 RX ORDER — PROCHLORPERAZINE MALEATE 10 MG
5 TABLET ORAL
Qty: 12 TABLET | Refills: 0 | Status: SHIPPED | OUTPATIENT
Start: 2021-12-20 | End: 2021-12-27

## 2021-12-20 RX ORDER — KETOROLAC TROMETHAMINE 30 MG/ML
15 INJECTION, SOLUTION INTRAMUSCULAR; INTRAVENOUS
Status: COMPLETED | OUTPATIENT
Start: 2021-12-20 | End: 2021-12-20

## 2021-12-20 RX ORDER — BUTALBITAL, ACETAMINOPHEN AND CAFFEINE 50; 325; 40 MG/1; MG/1; MG/1
1 TABLET ORAL
Status: COMPLETED | OUTPATIENT
Start: 2021-12-20 | End: 2021-12-20

## 2021-12-20 RX ORDER — NAPROXEN 500 MG/1
500 TABLET ORAL
Qty: 20 TABLET | Refills: 0 | Status: SHIPPED | OUTPATIENT
Start: 2021-12-20

## 2021-12-20 RX ADMIN — BUTALBITAL, ACETAMINOPHEN, AND CAFFEINE 1 TABLET: 50; 325; 40 TABLET ORAL at 17:38

## 2021-12-20 RX ADMIN — SODIUM CHLORIDE 1000 ML: 9 INJECTION, SOLUTION INTRAVENOUS at 17:37

## 2021-12-20 RX ADMIN — PROCHLORPERAZINE EDISYLATE 10 MG: 5 INJECTION INTRAMUSCULAR; INTRAVENOUS at 17:39

## 2021-12-20 RX ADMIN — DIPHENHYDRAMINE HYDROCHLORIDE 25 MG: 50 INJECTION, SOLUTION INTRAMUSCULAR; INTRAVENOUS at 17:38

## 2021-12-20 RX ADMIN — KETOROLAC TROMETHAMINE 15 MG: 30 INJECTION, SOLUTION INTRAMUSCULAR; INTRAVENOUS at 17:38

## 2021-12-21 NOTE — ED NOTES
I have reviewed discharge instructions with the patient. The patient verbalized understanding.  Ambulatory out of ED

## 2021-12-21 NOTE — ED PROVIDER NOTES
EMERGENCY DEPARTMENT HISTORY AND PHYSICAL EXAM      Date: 12/20/2021  Patient Name: Josefina Corral    History of Presenting Illness     Chief Complaint   Patient presents with    Migraine     She went to see her PCP for vertigo and migraines. The medicine they gave her did not help.  Dizziness    Neck Pain     Neck pain since Thursday. HPI: Josefina Corral, 40 y.o. female presents to the ED with cc of dizziness and migraine headache. She reports a history of intermittent vertigo, though this started bothering her again 5 days ago. She describes a sensation of the room spinning that happens when she moves her head. Feels better at rest.  She reports some associated tightness on the right side of her neck and right-sided headache that was not worst at onset. She reports associated photophobia and phonophobia with some nausea. No vomiting. No weakness numbness or tingling in the arms or legs, no recent fevers, no midline neck pain. She has a history of hypertension, however ran out of her medications. No history of trauma. There are no other complaints, changes, or physical findings at this time. PCP: Sita Montiel MD    No current facility-administered medications on file prior to encounter. Current Outpatient Medications on File Prior to Encounter   Medication Sig Dispense Refill    atomoxetine (STRATTERA) 25 mg capsule  (Patient not taking: Reported on 12/17/2021)      meclizine (ANTIVERT) 25 mg tablet Take 1 Tablet by mouth three (3) times daily as needed for Dizziness for up to 10 days. 30 Tablet 0    naproxen (Naprosyn) 500 mg tablet Take 1 Tablet by mouth two (2) times daily (with meals) for 10 days. (Patient not taking: Reported on 12/17/2021) 20 Tablet 0    ondansetron (ZOFRAN ODT) 4 mg disintegrating tablet Take 1 Tablet by mouth every eight (8) hours as needed for Nausea or Vomiting.  (Patient not taking: Reported on 12/17/2021) 20 Tablet 0    methylphenidate HCl (Ritalin) 20 mg tablet Take 20 mg by mouth three (3) times daily. (Patient not taking: Reported on 8/25/2021)      ALPRAZolam (Xanax) 1 mg tablet Take 1 mg by mouth. (Patient not taking: Reported on 8/25/2021)      oxyCODONE-acetaminophen (PERCOCET) 5-325 mg per tablet Take 1-2 Tabs by mouth every four (4) hours as needed for Pain. Max Daily Amount: 12 Tabs. (Patient not taking: Reported on 8/25/2021) 65 Tab 0    morphine CR (MS CONTIN) 15 mg CR tablet Take 1 Tab by mouth every twelve (12) hours. Max Daily Amount: 30 mg. (Patient not taking: Reported on 8/25/2021) 10 Tab 0    busPIRone (BUSPAR) 15 mg tablet Take 1 Tab by mouth two (2) times daily as needed. (Patient not taking: Reported on 8/25/2021) 60 Tab 0    Valproic Acid (DEPAKENE) 250 mg cpDR Take 250 mg by mouth two (2) times a day. (Patient not taking: Reported on 8/25/2021) 60 Cap 1    traZODone (DESYREL) 100 mg tablet Take 1 Tab by mouth nightly as needed for Sleep.  (Patient not taking: Reported on 8/25/2021) 30 Tab 1       Past History     Past Medical History:  Past Medical History:   Diagnosis Date    Adverse effect of anesthesia     \"heart kept stopping during the birth of my daughter\"    Anxiety and depression 11/25/2015    Bipolar 2 disorder ([address] 12/17/2015    Cancer ([address]     \"uterine cancer\" treated with cryo    History of bilateral tubal ligation 1/3/2017    History of sexually transmitted disease 2/18/2021    Hypertension     Multiple personality disorder ([address] 12/17/2015    Noncompliance with medications 2/18/2021    Personality disorder ([address]     multiple personalities    Schizophrenia ([address] 12/17/2015       Past Surgical History:  Past Surgical History:   Procedure Laterality Date    HX ACL RECONSTRUCTION Right     HX GYN      c section x3    HX TUBAL LIGATION         Family History:  Family History   Problem Relation Age of Onset    Stroke Mother    Johnella Cables Bladder Disease Mother     Stroke Father     Heart Disease Brother        Social History:  Social History     Tobacco Use    Smoking status: Former Smoker     Packs/day: 0.50     Years: 30.00     Pack years: 15.00     Quit date: 1/3/2014     Years since quittin.9    Smokeless tobacco: Never Used   Substance Use Topics    Alcohol use: No    Drug use: No       Allergies:  No Known Allergies      Review of Systems   no fever  No ear pain  No eye pain  no shortness of breath  no chest pain  no abdominal pain  no dysuria  no leg pain  No rash  No lymphadenopathy  No weight loss    Physical Exam   Physical Exam  Constitutional:       General: She is not in acute distress. Appearance: She is not toxic-appearing. HENT:      Head: Normocephalic. Eyes:      Extraocular Movements: Extraocular movements intact. Neck:      Comments: There is some tenderness to palpation over the right trapezius  Cardiovascular:      Rate and Rhythm: Normal rate and regular rhythm. Pulmonary:      Effort: Pulmonary effort is normal.      Breath sounds: Normal breath sounds. Abdominal:      Palpations: Abdomen is soft. Tenderness: There is no abdominal tenderness. Musculoskeletal:         General: No tenderness or deformity. Cervical back: Neck supple. No rigidity. Skin:     General: Skin is warm and dry. Neurological:      General: No focal deficit present. Mental Status: She is alert. Cranial Nerves: No cranial nerve deficit. Comments: 5/5 strength with bicep flexion and extension bilaterally, 5/5 strength with ankle flexion and extension bilaterally. Sensation to light touch intact over upper and lower extremities bilaterally. Normal finger-to-nose test bilaterally   Psychiatric:         Mood and Affect: Mood normal.         Diagnostic Study Results     Labs -   No results found for this or any previous visit (from the past 24 hour(s)).     Radiologic Studies -   No orders to display     CT Results  (Last 48 hours)    None        CXR Results  (Last 48 hours)    None            Medical Decision Making   I am the first provider for this patient. I reviewed the vital signs, available nursing notes, past medical history, past surgical history, family history and social history. Vital Signs-Reviewed the patient's vital signs. Patient Vitals for the past 24 hrs:   Temp Pulse Resp BP SpO2   12/20/21 1445 98.4 °F (36.9 °C) 98 18 (!) 165/113 100 %         Provider Notes (Medical Decision Making):   71-year-old female presenting with dizziness and headache. Her gradual onset right-sided headache with associated photophobia and photophobia is consistent with likely migraine headache. Differential includes tension type headache. She reports some associated spinning with head movements consistent with likely vertigo, symptoms consistent with peripheral vertigo. Her neurologic exam is otherwise unremarkable, no risk factors or concerns for CVA or central cause of vertigo. No thunderclap onset of headache, she is afebrile and nontoxic-appearing, normal neuro exam as above, unlikely intracranial infection or space-occupying lesion or bleed. Her vitals are reassuring, she is hypertensive but also noncompliant with antihypertensives, she is well-hydrated appearing, unlikely any significant electrolyte or metabolic abnormalities. ED Course:     Initial assessment performed. The patients presenting problems have been discussed, and they are in agreement with the care plan formulated and outlined with them. I have encouraged them to ask questions as they arise throughout their visit. Patient is given fluids and headache medications. She declines pregnancy test.    On reevaluation, patient is resting comfortably and states that they feel improved. Patient is counseled on supportive care and return precautions. Will return to the ED for any worsening pain, any fevers, weakness or numbness, worsening dizziness, or any new or worrisome symptoms.  Will followup with primary care doctor within 7 days. Critical Care Time:         Disposition:  Home    PLAN:  1. Discharge Medication List as of 12/20/2021  6:45 PM      START taking these medications    Details   prochlorperazine (Compazine) 10 mg tablet Take 0.5 Tablets by mouth every six (6) hours as needed for Nausea or Vomiting (headache) for up to 7 days. , Normal, Disp-12 Tablet, R-0      !! naproxen (NAPROSYN) 500 mg tablet Take 1 Tablet by mouth every twelve (12) hours as needed for Pain., Normal, Disp-20 Tablet, R-0       !! - Potential duplicate medications found. Please discuss with provider. CONTINUE these medications which have NOT CHANGED    Details   atomoxetine (STRATTERA) 25 mg capsule Historical Med      meclizine (ANTIVERT) 25 mg tablet Take 1 Tablet by mouth three (3) times daily as needed for Dizziness for up to 10 days. , Normal, Disp-30 Tablet, R-0      !! naproxen (Naprosyn) 500 mg tablet Take 1 Tablet by mouth two (2) times daily (with meals) for 10 days. , Normal, Disp-20 Tablet, R-0      ondansetron (ZOFRAN ODT) 4 mg disintegrating tablet Take 1 Tablet by mouth every eight (8) hours as needed for Nausea or Vomiting., Normal, Disp-20 Tablet, R-0      methylphenidate HCl (Ritalin) 20 mg tablet Take 20 mg by mouth three (3) times daily. , Historical Med      ALPRAZolam (Xanax) 1 mg tablet Take 1 mg by mouth., Historical Med      oxyCODONE-acetaminophen (PERCOCET) 5-325 mg per tablet Take 1-2 Tabs by mouth every four (4) hours as needed for Pain. Max Daily Amount: 12 Tabs., Print, Disp-65 Tab, R-0      morphine CR (MS CONTIN) 15 mg CR tablet Take 1 Tab by mouth every twelve (12) hours. Max Daily Amount: 30 mg., Print, Disp-10 Tab, R-0      busPIRone (BUSPAR) 15 mg tablet Take 1 Tab by mouth two (2) times daily as needed. , Print, Disp-60 Tab, R-0      Valproic Acid (DEPAKENE) 250 mg cpDR Take 250 mg by mouth two (2) times a day., Normal, Disp-60 Cap, R-1      traZODone (DESYREL) 100 mg tablet Take 1 Tab by mouth nightly as needed for Sleep., Normal, Disp-30 Tab, R-1       !! - Potential duplicate medications found. Please discuss with provider.         2.   Follow-up Information     Follow up With Specialties Details Why Contact Info    Marv Orellana MD [facility] Medicine Schedule an appointment as soon as possible for a visit in 2 days As needed, If symptoms worsen [address]  [address] [phone]      [facility] EMERGENCY DEPT Emergency Medicine  As needed, If symptoms worsen [address] [place] [address]  [phone]        Return to ED if worse     Diagnosis     Clinical Impression: Acute vertigo, acute headache

## 2021-12-28 ENCOUNTER — VIRTUAL VISIT (OUTPATIENT)
Dept: FAMILY MEDICINE CLINIC | Age: 46
End: 2021-12-28
Payer: MEDICAID

## 2021-12-28 DIAGNOSIS — G43.701 CHRONIC MIGRAINE WITHOUT AURA WITH STATUS MIGRAINOSUS, NOT INTRACTABLE: ICD-10-CM

## 2021-12-28 DIAGNOSIS — F51.05 INSOMNIA DUE TO OTHER MENTAL DISORDER: ICD-10-CM

## 2021-12-28 DIAGNOSIS — G43.701 CHRONIC MIGRAINE WITHOUT AURA WITH STATUS MIGRAINOSUS, NOT INTRACTABLE: Primary | ICD-10-CM

## 2021-12-28 DIAGNOSIS — M62.838 NECK MUSCLE SPASM: ICD-10-CM

## 2021-12-28 DIAGNOSIS — F99 INSOMNIA DUE TO OTHER MENTAL DISORDER: ICD-10-CM

## 2021-12-28 PROCEDURE — 99213 OFFICE O/P EST LOW 20 MIN: CPT | Performed by: FAMILY MEDICINE

## 2021-12-28 RX ORDER — CYCLOBENZAPRINE HCL 10 MG
10 TABLET ORAL
Qty: 15 TABLET | Refills: 0 | Status: SHIPPED | OUTPATIENT
Start: 2021-12-28 | End: 2021-12-28 | Stop reason: SDUPTHER

## 2021-12-28 RX ORDER — TRAZODONE HYDROCHLORIDE 100 MG/1
100 TABLET ORAL
Qty: 30 TABLET | Refills: 1 | Status: SHIPPED | OUTPATIENT
Start: 2021-12-28 | End: 2021-12-28 | Stop reason: SDUPTHER

## 2021-12-28 RX ORDER — METOPROLOL SUCCINATE 100 MG/1
100 TABLET, EXTENDED RELEASE ORAL DAILY
Qty: 30 TABLET | Refills: 0 | Status: SHIPPED | OUTPATIENT
Start: 2021-12-28 | End: 2021-12-28 | Stop reason: SDUPTHER

## 2021-12-28 RX ORDER — SUMATRIPTAN 50 MG/1
TABLET, FILM COATED ORAL
COMMUNITY
Start: 2021-12-17 | End: 2021-12-29

## 2021-12-28 RX ORDER — HYDROCODONE BITARTRATE AND ACETAMINOPHEN 5; 325 MG/1; MG/1
1 TABLET ORAL
Qty: 6 TABLET | Refills: 0 | Status: SHIPPED | OUTPATIENT
Start: 2021-12-28 | End: 2021-12-28 | Stop reason: SDUPTHER

## 2021-12-28 NOTE — TELEPHONE ENCOUNTER
Patient would like for her three RX to go to pharmacy on file         cyclobenzaprine (FLEXERIL) 10 mg tablet     traZODone (DESYREL) 100 mg tablet            HYDROcodone-acetaminophen (NORCO) 5-325 mg per tablet       She can be reached @ [phone]

## 2021-12-28 NOTE — TELEPHONE ENCOUNTER
----- Message from [place] sent at 12/28/2021  1:45 PM EST -----  Subject: Message to Provider    QUESTIONS  Information for Provider? Patient Delena Angelucci is requesting a   prescription refill for Metoprolol. Ms. Genaro Gregg would like to be reached   regarding this message as soon as possible.   ---------------------------------------------------------------------------  --------------  CALL BACK INFO  What is the best way for the office to contact you? OK to leave message on   voicemail  Preferred Call Back Phone Number? [phone]  ---------------------------------------------------------------------------  --------------  SCRIPT ANSWERS  Relationship to Patient?  Self

## 2021-12-28 NOTE — PROGRESS NOTES
Chief Complaint   Patient presents with    Stiff Neck     seen in ER    Migraine       1. Have you been to the ER, urgent care clinic since your last visit? Hospitalized since your last visit? Yes     2. Have you seen or consulted any other health care providers outside of the [address] [place] [address] since your last visit? Include any pap smears or colon screening.  No

## 2021-12-28 NOTE — PROGRESS NOTES
Jeb Mello is a 40 y.o. female evaluated via telephone on 12/28/2021. Consent:  She and/or health care decision maker is aware that she may receive a bill for this telephone service, depending on her insurance coverage, and has provided verbal consent to proceed: Yes      Documentation:  I communicated with the patient and/or health care decision maker about;   Details of this discussion including any medical advice provided:     4th visit with this physician,   She's moved to [place] [id number]. has a past medical history of Adverse effect of anesthesia, Anxiety and depression (11/25/2015), Bipolar 2 disorder ([facility] (12/17/2015), Cancer ([facility] Headache, History of bilateral tubal ligation (1/3/2017), History of sexually transmitted disease (2/18/2021), Hypertension, Multiple personality disorder ([facility] (12/17/2015), Noncompliance with medications (2/18/2021), Personality disorder ([facility] and Schizophrenia ([facility] (12/17/2015). 1 ED visits since our last apt. BPV and migraine HA, saw me on 12/17  Sumatriptan and meclizine didn't help    Went to ED 12/20/21  For dizziness, headache and necks stiffness, was given naproxen and compazine    Her headache is better but still there and coming back. But neck muscle spasm  And insomnia  We discussed she need to find a PCP there   Will give her flexeril #15  norco #5    Insomnia: due to psych  Give her 2 months of trazodone     Hx of bipolar, schizophrenia, multiple personality disorder, anxiety depression.  Not compliant with psych meds  Was seeing psych, but no longer? She says will find another. Will leave this alone.        Diagnoses and all orders for this visit:    1. Chronic migraine without aura with status migrainosus, not intractable  -     HYDROcodone-acetaminophen (NORCO) 5-325 mg per tablet; Take 1 Tablet by mouth two (2) times daily as needed for Pain for up to 3 days. Max Daily Amount: 2 Tablets.     2. Neck muscle spasm  -     cyclobenzaprine (FLEXERIL) 10 mg tablet; Take 1 Tablet by mouth two (2) times daily as needed for Muscle Spasm(s). -     HYDROcodone-acetaminophen (NORCO) 5-325 mg per tablet; Take 1 Tablet by mouth two (2) times daily as needed for Pain for up to 3 days. Max Daily Amount: 2 Tablets. 3. Insomnia due to other mental disorder  -     traZODone (DESYREL) 100 mg tablet; Take 1 Tablet by mouth nightly as needed for Sleep. Follow-up and Dispositions    · Return in about 3 days (around 2021), or if symptoms worsen or fail to improve. Past Medical History:   Diagnosis Date    Adverse effect of anesthesia     \"heart kept stopping during the birth of my daughter\"    Anxiety and depression 2015    Bipolar 2 disorder ([facility] 2015    Cancer ([facility]     \"uterine cancer\" treated with cryo    Headache     History of bilateral tubal ligation 1/3/2017    History of sexually transmitted disease 2021    Hypertension     Multiple personality disorder ([facility] 2015    Noncompliance with medications 2021    Personality disorder ([facility]     multiple personalities    Schizophrenia ([facility] 2015       Current Outpatient Medications on File Prior to Visit   Medication Sig Dispense Refill    SUMAtriptan (IMITREX) 50 mg tablet  (Patient not taking: Reported on 2021)      [] prochlorperazine (Compazine) 10 mg tablet Take 0.5 Tablets by mouth every six (6) hours as needed for Nausea or Vomiting (headache) for up to 7 days. 12 Tablet 0    naproxen (NAPROSYN) 500 mg tablet Take 1 Tablet by mouth every twelve (12) hours as needed for Pain. (Patient not taking: Reported on 2021) 20 Tablet 0    atomoxetine (STRATTERA) 25 mg capsule  (Patient not taking: Reported on 2021)      [] meclizine (ANTIVERT) 25 mg tablet Take 1 Tablet by mouth three (3) times daily as needed for Dizziness for up to 10 days.  30 Tablet 0    ondansetron (ZOFRAN ODT) 4 mg disintegrating tablet Take 1 Tablet by mouth every eight (8) hours as needed for Nausea or Vomiting. (Patient not taking: Reported on 12/17/2021) 20 Tablet 0    methylphenidate HCl (Ritalin) 20 mg tablet Take 20 mg by mouth three (3) times daily. (Patient not taking: Reported on 8/25/2021)      ALPRAZolam (Xanax) 1 mg tablet Take 1 mg by mouth. (Patient not taking: Reported on 8/25/2021)      busPIRone (BUSPAR) 15 mg tablet Take 1 Tab by mouth two (2) times daily as needed. (Patient not taking: Reported on 8/25/2021) 60 Tab 0     No current facility-administered medications on file prior to visit. I affirm this is a Patient Initiated Episode with a Patient who has not had a related appointment within my department in the past 7 days or scheduled within the next 24 hours.     Total Time: minutes: 11-20 minutes    Note: not billable if this call serves to triage the patient into an appointment for the relevant concern      Sanchez Bowen MD

## 2021-12-29 DIAGNOSIS — G43.C0 PERIODIC HEADACHE SYNDROMES IN CHILD OR ADULT, NOT INTRACTABLE: ICD-10-CM

## 2021-12-29 RX ORDER — SUMATRIPTAN 50 MG/1
TABLET, FILM COATED ORAL
Qty: 15 TABLET | Refills: 1 | Status: SHIPPED | OUTPATIENT
Start: 2021-12-29

## 2021-12-29 RX ORDER — CYCLOBENZAPRINE HCL 10 MG
10 TABLET ORAL
Qty: 15 TABLET | Refills: 0 | Status: SHIPPED | OUTPATIENT
Start: 2021-12-29

## 2021-12-29 RX ORDER — HYDROCODONE BITARTRATE AND ACETAMINOPHEN 5; 325 MG/1; MG/1
1 TABLET ORAL
Qty: 6 TABLET | Refills: 0 | Status: SHIPPED | OUTPATIENT
Start: 2021-12-29 | End: 2022-01-01

## 2021-12-29 RX ORDER — TRAZODONE HYDROCHLORIDE 100 MG/1
100 TABLET ORAL
Qty: 30 TABLET | Refills: 1 | Status: SHIPPED | OUTPATIENT
Start: 2021-12-29

## 2021-12-29 RX ORDER — METOPROLOL SUCCINATE 100 MG/1
100 TABLET, EXTENDED RELEASE ORAL DAILY
Qty: 30 TABLET | Refills: 5 | Status: SHIPPED | OUTPATIENT
Start: 2021-12-29

## 2022-03-19 PROBLEM — Z91.148 NONCOMPLIANCE WITH MEDICATIONS: Status: ACTIVE | Noted: 2021-02-18

## 2022-03-19 PROBLEM — Z86.19 HISTORY OF SEXUALLY TRANSMITTED DISEASE: Status: ACTIVE | Noted: 2021-02-18

## 2022-03-19 PROBLEM — S83.512A RUPTURE OF ANTERIOR CRUCIATE LIGAMENT OF LEFT KNEE: Status: ACTIVE | Noted: 2017-03-08

## 2022-03-20 PROBLEM — Z98.51 HISTORY OF BILATERAL TUBAL LIGATION: Status: ACTIVE | Noted: 2017-01-03

## 2023-10-26 ENCOUNTER — HOSPITAL ENCOUNTER (OUTPATIENT)
Facility: HOSPITAL | Age: 48
Discharge: HOME | End: 2023-10-29

## 2023-10-26 ENCOUNTER — HOSPITAL ENCOUNTER (OUTPATIENT)
Facility: HOSPITAL | Age: 48
Discharge: HOME | End: 2023-10-29
Payer: MEDICAID

## 2023-10-26 DIAGNOSIS — R44.1 VISUAL HALLUCINATIONS: ICD-10-CM

## 2023-10-26 DIAGNOSIS — F41.8 MIXED ANXIETY AND DEPRESSIVE DISORDER: ICD-10-CM

## 2023-10-26 DIAGNOSIS — F44.81 MULTIPLE PERSONALITY DISORDER (HCC): ICD-10-CM

## 2023-10-26 DIAGNOSIS — F43.10 PTSD (POST-TRAUMATIC STRESS DISORDER): ICD-10-CM

## 2023-10-26 DIAGNOSIS — F90.0 ATTENTION DEFICIT HYPERACTIVITY DISORDER (ADHD), PREDOMINANTLY INATTENTIVE TYPE: Primary | ICD-10-CM

## 2023-10-26 PROCEDURE — 90792 PSYCH DIAG EVAL W/MED SRVCS: CPT | Performed by: MIDWIFE

## 2023-10-26 RX ORDER — DEXTROAMPHETAMINE SACCHARATE, AMPHETAMINE ASPARTATE, DEXTROAMPHETAMINE SULFATE AND AMPHETAMINE SULFATE 2.5; 2.5; 2.5; 2.5 MG/1; MG/1; MG/1; MG/1
10 TABLET ORAL DAILY
COMMUNITY

## 2023-10-26 NOTE — BH NOTE
Stopped drinking when Unique was a baby. Smokes: 1/3 PPD  Marijuana: does not like smoking, likes edibles, last use 1.5 months ago  Cocaine occasionally: last use a month ago  Tried \"everything\" when younger  Caffeine: coffee in the a.m. and tea. Stops caffeine at 3 p.m. Soda: monthly     Medication trials/side effects from medications:   Ritalin: did well with this med; at \"the max dose TID,\" it worked well  Adderall: \"too strong\"  Sertraline: improved depression   Wellbutrin: caused fatigue  Most antidepressants: caused fatigue, would refuse them now    Psychosocial History: Father was in [facility], mother was an . They moved often, she attended many different schools. She always felt invisible in school, none of her teachers seemed to identify her problems with attention. Had three brothers: Garfield 46, Brigido 55, and a step brother. Had a total of 8 siblings/step-siblings. Had one sister: Shirley Santana, who is now52 was not raised with the rest of her family. Her parents , her father remarried; her step-mother demanded Shirley Santana be removed and put up for adoption but Deja's mother picked her up and raised her. A few happy memories but more bad memories. Family History:   Family History   Problem Relation Age of Onset    Heart Disease Brother     Gall Bladder Disease Mother     Stroke Mother     Stroke Father      Mental Status Examination     I. Reliability in Providing Information: Good      II. Personal Presentation: Looks stated age; dressed appropriately      III. Motor Activity: Normal       IV. Speech Pattern:  Normal      V. Mood: Euthymic      Vl. Eye Contact:  Appropriate       Vll. Affect: Appropriate       VllI. Thought Processes        Thought Process:  goal-directed and logical          Thought Content: No delusions, phobias, obsessions, or compulsions        Hallucinations: None        Suicidal Ideation/Attempts: No         Homicidal Ideation/Attempts: No         Self-harm: No           IX.

## 2023-11-07 ENCOUNTER — TELEPHONE (OUTPATIENT)
Facility: HOSPITAL | Age: 48
End: 2023-11-07

## 2023-11-07 RX ORDER — ATOMOXETINE 25 MG/1
25 CAPSULE ORAL DAILY
Qty: 30 CAPSULE | Refills: 0 | Status: SHIPPED | OUTPATIENT
Start: 2023-11-07 | End: 2023-12-07

## 2023-11-07 NOTE — TELEPHONE ENCOUNTER
UDS resulted positive for cocaine. Phoned patient to discuss the result. Explained I am not able to rx a controlled substance for her at this time. Discussed non-stimulant medication for ADHD and offered atomoxetine. Reviewed differences in stimulants versus non-stimulants; that we start with a relatively low dose and increase to effect. She accepted. 25 mg once daily a.m., #30 sent to her pharmacy. Encouraged her to call with any bothersome side effects. She asked if we could order another drug screen if atomoxetine is not effective. Discussed recommendations for maximizing the potential of a new medication prior to discontinuing and trying another. That we may be able to consider Ritalin after a negative drug screen in the future if atomoxetine does not improve her symptoms of ADHD. She verbalized understanding and agrees to the plan. She has a follow up appointment scheduled for 11/20/2023.

## 2024-01-08 ENCOUNTER — HOSPITAL ENCOUNTER (OUTPATIENT)
Facility: HOSPITAL | Age: 49
Discharge: HOME | End: 2024-01-11
Payer: MEDICAID

## 2024-01-08 DIAGNOSIS — F41.8 MIXED ANXIETY AND DEPRESSIVE DISORDER: ICD-10-CM

## 2024-01-08 DIAGNOSIS — F44.81 MULTIPLE PERSONALITY DISORDER (HCC): ICD-10-CM

## 2024-01-08 DIAGNOSIS — F90.0 ATTENTION DEFICIT HYPERACTIVITY DISORDER (ADHD), PREDOMINANTLY INATTENTIVE TYPE: Primary | ICD-10-CM

## 2024-01-08 DIAGNOSIS — F43.10 PTSD (POST-TRAUMATIC STRESS DISORDER): ICD-10-CM

## 2024-01-08 PROCEDURE — 99214 OFFICE O/P EST MOD 30 MIN: CPT | Performed by: MIDWIFE

## 2024-01-08 RX ORDER — ATOMOXETINE 25 MG/1
25 CAPSULE ORAL DAILY
Qty: 30 CAPSULE | Refills: 0 | Status: SHIPPED | OUTPATIENT
Start: 2024-01-08 | End: 2024-02-07

## 2024-01-08 NOTE — BH NOTE
family members are chaotic and complicated. She has never been in formal therapy, which is my primary recommendation, and she seems amenable to trying it.    Today:     CC: Nilsa presents for ongoing medication management of ADHD, anxiety, depression, PTSD, multiple personality disorder, and visual hallucinations.    HPI: Transportation has been an issue for Josette but she and her partner have purchased a car. After she started taking Strattera, she felt she was able to get out of bed more easily but still lacked sufficient focus to complete tasks. She increased her dose to BID without letting me know and ran out of the medication. She then had trouble returning to the office due to transportation issues and I wasn't able to reach her for a scheduled phone visit. After taking the medication, she talked to her family who state they feel she is still \"lazy.\" She and they feel the Ritalin was a better medication medication than the Strattera. She is still working on her family's estate and repairing the house.     She is happy to state that it has been more than a month  since she used cocaine.     She has \"kicked the bad influencers out of the house.\" She is now living with her boyfriend Catrachito. Her brother or another family member comes for the weekends and she  has a positive relationships with all of her family members. She doesn't want to start talk therapy at this time.    REVIEW OF SYSTEMS: Pertinent items are noted in the History of Present Illness. All other Systems reviewed and are considered negative.    Mental Status Examination:    I. Reliability in Providing Information: Good      II. Personal Presentation: Looks stated age; dressed appropriately      III. Motor Activity: Normal       IV. Speech Pattern:  Normal      V. Mood: Euthymic      Vl. Eye Contact:  Appropriate       Vll. Affect: Appropriate       VllI. Thought Processes        Thought Process:  goal-directed and logical          Thought Content:

## 2024-02-05 NOTE — BH NOTE
[facility]  [facility]  Francisca Aguilar, PMHNP     Name: Nilsa Meléndez                                              MRN:  [id number]           Follow up Medication Management Visit     Time in: 0830     Time out: 0900     Collateral: none    Nilsa Meléndez, who was evaluated through a patient-initiated, synchronous (real-time) audio only encounter, and/or her healthcare decision maker, is aware that it is a billable service, with coverage as determined by her insurance carrier. She provided verbal consent to proceed: Yes. She has not had a related appointment within my department in the past 7 days or scheduled within the next 24 hours. The patient was located at Home. Provider was located at [facility].      Patient Identification: \"Josette\" is a single 48 year old  female who is on disability. In a 3 year relationship  and lives with Catrachito (works in [employer] [place]). Children: Andrew 32, Unique 31, Padilla 27, Karin Vincent \"Max\" is non-binary 15 lives with her father.      Last Visit/interim information:     First visit: Clinical Formulation: Josette meets criteria for anxiety, depression, PTSD, Multiple personality, and has visual hallucinations. Sleep is an issue but she is not concerned; a structured sleep routine would be beneficial and will be revisited. The only condition she feels is a problem and will accept medication for is ADHD and the only medication which has helped in the past is Ritalin. She is willing to take drug screens in order to obtain it. Her interpersonal relations with family members are chaotic and complicated. She has never been in formal therapy, which is my primary recommendation, and she seems amenable to trying it.  Phone call 11/7/2023: UDS resulted positive for cocaine. Phoned patient to discuss the result. Explained I am not able to rx a controlled substance for her at this time. Discussed

## 2024-02-06 ENCOUNTER — HOSPITAL ENCOUNTER (OUTPATIENT)
Facility: HOSPITAL | Age: 49
Discharge: HOME | End: 2024-02-09
Payer: MEDICAID

## 2024-02-06 DIAGNOSIS — R44.1 VISUAL HALLUCINATIONS: ICD-10-CM

## 2024-02-06 DIAGNOSIS — F41.8 MIXED ANXIETY AND DEPRESSIVE DISORDER: ICD-10-CM

## 2024-02-06 DIAGNOSIS — F43.10 PTSD (POST-TRAUMATIC STRESS DISORDER): ICD-10-CM

## 2024-02-06 DIAGNOSIS — F90.0 ATTENTION DEFICIT HYPERACTIVITY DISORDER (ADHD), PREDOMINANTLY INATTENTIVE TYPE: Primary | ICD-10-CM

## 2024-02-06 DIAGNOSIS — F44.81 MULTIPLE PERSONALITY DISORDER (HCC): ICD-10-CM

## 2024-02-06 PROCEDURE — 99214 OFFICE O/P EST MOD 30 MIN: CPT | Performed by: MIDWIFE

## 2024-02-06 RX ORDER — ATOMOXETINE 40 MG/1
40 CAPSULE ORAL DAILY
Qty: 30 CAPSULE | Refills: 0 | Status: SHIPPED | OUTPATIENT
Start: 2024-02-06 | End: 2024-03-07

## 2024-05-13 ENCOUNTER — TELEPHONE (OUTPATIENT)
Age: 49
End: 2024-05-13

## 2024-05-13 NOTE — TELEPHONE ENCOUNTER
Received below message from [facility] (answering service). Attempted to call patient but no answer. Unable to leave  R/T full mailbox.   In regards to new order request, patient has never been seen in this office. Last PCP note in chart is from Naomie Lake with [facility]. In orders to be placed patient will first need to establish care in this office. In turn she can take her request to her established PCP.         ----- Message from Deepti Mitsry sent at 5/13/2024  9:19 AM EDT -----  Subject: Referral Request    Reason for referral request? Pt would like an order placed for a   colonoscopy. Please call when placed.   Provider patient wants to be referred to(if known):     Provider Phone Number(if known):    Additional Information for Provider?   ---------------------------------------------------------------------------  --------------  CALL BACK INFO    [phone]; OK to leave message on voicemail,OK to respond with electronic   message via [employer] portal (only for patients who have registered [employer]   account)  ---------------------------------------------------------------------------  --------------

## 2025-01-21 ENCOUNTER — HOSPITAL ENCOUNTER (OUTPATIENT)
Facility: HOSPITAL | Age: 50
Discharge: HOME | End: 2025-01-24
Payer: MEDICAID

## 2025-01-21 DIAGNOSIS — G47.9 SLEEP DISORDER: Primary | ICD-10-CM

## 2025-01-21 PROCEDURE — 99214 OFFICE O/P EST MOD 30 MIN: CPT | Performed by: MIDWIFE

## 2025-01-21 NOTE — BH NOTE
[facility]  [facility]  [facility]     Name: Nilsa Meléndez                                              MRN:  [id number]           Follow up Medication Management Visit     Collateral: none     Patient Identification: \"Josette\" is a single 48 year old  female who is on disability. In a 3 year relationship  and lives with Catrachito (works in [employer]). Children: Andrew 32, Unique 31, Padilla 27, Karin Vincent \"Max\" is non-binary 15 lives with her father (ages need to be updated).      Last Visit/interim information:     First visit: Clinical Formulation: Josette meets criteria for anxiety, depression, PTSD, Multiple personality, and has visual hallucinations. Sleep is an issue but she is not concerned; a structured sleep routine would be beneficial and will be revisited. The only condition she feels is a problem and will accept medication for is ADHD and the only medication which has helped in the past is Ritalin. She is willing to take drug screens in order to obtain it. Her interpersonal relations with family members are chaotic and complicated. She has never been in formal therapy, which is my primary recommendation, and she seems amenable to trying it.  Phone call 11/7/2023: UDS resulted positive for cocaine. Phoned patient to discuss the result. Explained I am not able to rx a controlled substance for her at this time. Discussed non-stimulant medication for ADHD and offered atomoxetine. Reviewed differences in stimulants versus non-stimulants; that we start with a relatively low dose and increase to effect. She accepted. 25 mg once daily a.m., #30 sent to her pharmacy. Encouraged her to call with any bothersome side effects. She asked if we could order another drug screen if atomoxetine is not effective. Discussed recommendations for maximizing the potential of a new medication prior to discontinuing and trying another. That we may be able to

## 2025-05-25 ENCOUNTER — APPOINTMENT (OUTPATIENT)
Facility: HOSPITAL | Age: 50
End: 2025-05-25
Payer: MEDICAID

## 2025-05-25 ENCOUNTER — HOSPITAL ENCOUNTER (EMERGENCY)
Facility: HOSPITAL | Age: 50
Discharge: HOME | End: 2025-05-25
Attending: EMERGENCY MEDICINE
Payer: MEDICAID

## 2025-05-25 VITALS
RESPIRATION RATE: 18 BRPM | DIASTOLIC BLOOD PRESSURE: 94 MMHG | HEART RATE: 93 BPM | BODY MASS INDEX: 31.68 KG/M2 | SYSTOLIC BLOOD PRESSURE: 137 MMHG | OXYGEN SATURATION: 96 % | HEIGHT: 60 IN | WEIGHT: 161.38 LBS | TEMPERATURE: 97.7 F

## 2025-05-25 DIAGNOSIS — K57.32 DIVERTICULITIS OF COLON: ICD-10-CM

## 2025-05-25 DIAGNOSIS — R07.9 ACUTE CHEST PAIN: Primary | ICD-10-CM

## 2025-05-25 LAB
ALBUMIN SERPL-MCNC: 3.1 G/DL (ref 3.5–5)
ALBUMIN/GLOB SERPL: 0.6 (ref 1.1–2.2)
ALP SERPL-CCNC: 102 U/L (ref 45–117)
ALT SERPL-CCNC: 18 U/L (ref 12–78)
ANION GAP SERPL CALC-SCNC: 5 MMOL/L (ref 2–12)
APPEARANCE UR: ABNORMAL
AST SERPL-CCNC: 20 U/L (ref 15–37)
BACTERIA URNS QL MICRO: ABNORMAL /HPF
BASOPHILS # BLD: 0.05 K/UL (ref 0–0.1)
BASOPHILS NFR BLD: 0.5 % (ref 0–1)
BILIRUB SERPL-MCNC: 0.6 MG/DL (ref 0.2–1)
BILIRUB UR QL CFM: NEGATIVE
BUN SERPL-MCNC: 10 MG/DL (ref 6–20)
BUN/CREAT SERPL: 13 (ref 12–20)
CALCIUM SERPL-MCNC: 9.3 MG/DL (ref 8.5–10.1)
CHLORIDE SERPL-SCNC: 105 MMOL/L (ref 97–108)
CO2 SERPL-SCNC: 27 MMOL/L (ref 21–32)
COLOR UR: ABNORMAL
COMMENT:: NORMAL
CREAT SERPL-MCNC: 0.78 MG/DL (ref 0.55–1.02)
DIFFERENTIAL METHOD BLD: NORMAL
EKG ATRIAL RATE: 105 BPM
EKG DIAGNOSIS: NORMAL
EKG P AXIS: 41 DEGREES
EKG P-R INTERVAL: 124 MS
EKG Q-T INTERVAL: 346 MS
EKG QRS DURATION: 80 MS
EKG QTC CALCULATION (BAZETT): 457 MS
EKG R AXIS: 45 DEGREES
EKG T AXIS: 80 DEGREES
EKG VENTRICULAR RATE: 105 BPM
EOSINOPHIL # BLD: 0.19 K/UL (ref 0–0.4)
EOSINOPHIL NFR BLD: 1.9 % (ref 0–7)
EPITH CASTS URNS QL MICRO: ABNORMAL /LPF
ERYTHROCYTE [DISTWIDTH] IN BLOOD BY AUTOMATED COUNT: 12.3 % (ref 11.5–14.5)
GLOBULIN SER CALC-MCNC: 5 G/DL (ref 2–4)
GLUCOSE SERPL-MCNC: 96 MG/DL (ref 65–100)
GLUCOSE UR STRIP.AUTO-MCNC: NEGATIVE MG/DL
HCT VFR BLD AUTO: 44 % (ref 35–47)
HGB BLD-MCNC: 14.9 G/DL (ref 11.5–16)
HGB UR QL STRIP: NEGATIVE
IMM GRANULOCYTES # BLD AUTO: 0.03 K/UL (ref 0–0.04)
IMM GRANULOCYTES NFR BLD AUTO: 0.3 % (ref 0–0.5)
KETONES UR QL STRIP.AUTO: 15 MG/DL
LEUKOCYTE ESTERASE UR QL STRIP.AUTO: NEGATIVE
LIPASE SERPL-CCNC: 60 U/L (ref 13–75)
LYMPHOCYTES # BLD: 2.23 K/UL (ref 0.8–3.5)
LYMPHOCYTES NFR BLD: 22 % (ref 12–49)
MCH RBC QN AUTO: 30.5 PG (ref 26–34)
MCHC RBC AUTO-ENTMCNC: 33.9 G/DL (ref 30–36.5)
MCV RBC AUTO: 90 FL (ref 80–99)
MONOCYTES # BLD: 0.92 K/UL (ref 0–1)
MONOCYTES NFR BLD: 9.1 % (ref 5–13)
MUCOUS THREADS URNS QL MICRO: ABNORMAL /LPF
NEUTS SEG # BLD: 6.71 K/UL (ref 1.8–8)
NEUTS SEG NFR BLD: 66.2 % (ref 32–75)
NITRITE UR QL STRIP.AUTO: NEGATIVE
NRBC # BLD: 0 K/UL (ref 0–0.01)
NRBC BLD-RTO: 0 PER 100 WBC
PH UR STRIP: 6 (ref 5–8)
PLATELET # BLD AUTO: 284 K/UL (ref 150–400)
PMV BLD AUTO: 9.3 FL (ref 8.9–12.9)
POTASSIUM SERPL-SCNC: 3.7 MMOL/L (ref 3.5–5.1)
PROT SERPL-MCNC: 8.1 G/DL (ref 6.4–8.2)
PROT UR STRIP-MCNC: 30 MG/DL
RBC # BLD AUTO: 4.89 M/UL (ref 3.8–5.2)
RBC #/AREA URNS HPF: ABNORMAL /HPF (ref 0–5)
SODIUM SERPL-SCNC: 137 MMOL/L (ref 136–145)
SP GR UR REFRACTOMETRY: 1.02
SPECIMEN HOLD: NORMAL
TROPONIN I SERPL HS-MCNC: 4 NG/L (ref 0–51)
TROPONIN I SERPL HS-MCNC: 5 NG/L (ref 0–51)
URINE CULTURE IF INDICATED: ABNORMAL
UROBILINOGEN UR QL STRIP.AUTO: 2 EU/DL (ref 0.2–1)
WBC # BLD AUTO: 10.1 K/UL (ref 3.6–11)
WBC URNS QL MICRO: ABNORMAL /HPF (ref 0–4)

## 2025-05-25 PROCEDURE — 80053 COMPREHEN METABOLIC PANEL: CPT

## 2025-05-25 PROCEDURE — 93005 ELECTROCARDIOGRAM TRACING: CPT | Performed by: EMERGENCY MEDICINE

## 2025-05-25 PROCEDURE — 6370000000 HC RX 637 (ALT 250 FOR IP): Performed by: EMERGENCY MEDICINE

## 2025-05-25 PROCEDURE — 85025 COMPLETE CBC W/AUTO DIFF WBC: CPT

## 2025-05-25 PROCEDURE — 96375 TX/PRO/DX INJ NEW DRUG ADDON: CPT

## 2025-05-25 PROCEDURE — 83690 ASSAY OF LIPASE: CPT

## 2025-05-25 PROCEDURE — 81001 URINALYSIS AUTO W/SCOPE: CPT

## 2025-05-25 PROCEDURE — 6360000004 HC RX CONTRAST MEDICATION: Performed by: EMERGENCY MEDICINE

## 2025-05-25 PROCEDURE — 71045 X-RAY EXAM CHEST 1 VIEW: CPT

## 2025-05-25 PROCEDURE — 36415 COLL VENOUS BLD VENIPUNCTURE: CPT

## 2025-05-25 PROCEDURE — 96374 THER/PROPH/DIAG INJ IV PUSH: CPT

## 2025-05-25 PROCEDURE — 84484 ASSAY OF TROPONIN QUANT: CPT

## 2025-05-25 PROCEDURE — 6360000002 HC RX W HCPCS: Performed by: EMERGENCY MEDICINE

## 2025-05-25 PROCEDURE — 74177 CT ABD & PELVIS W/CONTRAST: CPT

## 2025-05-25 PROCEDURE — 99285 EMERGENCY DEPT VISIT HI MDM: CPT

## 2025-05-25 RX ORDER — METRONIDAZOLE 500 MG/1
500 TABLET ORAL 3 TIMES DAILY
Qty: 30 TABLET | Refills: 0 | Status: SHIPPED | OUTPATIENT
Start: 2025-05-25 | End: 2025-06-04

## 2025-05-25 RX ORDER — ONDANSETRON 2 MG/ML
4 INJECTION INTRAMUSCULAR; INTRAVENOUS ONCE
Status: COMPLETED | OUTPATIENT
Start: 2025-05-25 | End: 2025-05-25

## 2025-05-25 RX ORDER — LEVOFLOXACIN 500 MG/1
500 TABLET, FILM COATED ORAL
Status: COMPLETED | OUTPATIENT
Start: 2025-05-25 | End: 2025-05-25

## 2025-05-25 RX ORDER — CIPROFLOXACIN 500 MG/1
500 TABLET, FILM COATED ORAL 2 TIMES DAILY
Qty: 20 TABLET | Refills: 0 | Status: SHIPPED | OUTPATIENT
Start: 2025-05-25 | End: 2025-06-04

## 2025-05-25 RX ORDER — TRAMADOL HYDROCHLORIDE 50 MG/1
50 TABLET ORAL EVERY 6 HOURS PRN
COMMUNITY
Start: 2025-03-26 | End: 2025-05-25 | Stop reason: CLARIF

## 2025-05-25 RX ORDER — MORPHINE SULFATE 4 MG/ML
4 INJECTION, SOLUTION INTRAMUSCULAR; INTRAVENOUS
Status: COMPLETED | OUTPATIENT
Start: 2025-05-25 | End: 2025-05-25

## 2025-05-25 RX ORDER — IOPAMIDOL 755 MG/ML
100 INJECTION, SOLUTION INTRAVASCULAR
Status: COMPLETED | OUTPATIENT
Start: 2025-05-25 | End: 2025-05-25

## 2025-05-25 RX ORDER — HYDROCODONE BITARTRATE AND ACETAMINOPHEN 5; 325 MG/1; MG/1
1 TABLET ORAL EVERY 4 HOURS PRN
Qty: 18 TABLET | Refills: 0 | Status: SHIPPED | OUTPATIENT
Start: 2025-05-25 | End: 2025-05-28

## 2025-05-25 RX ORDER — METRONIDAZOLE 250 MG/1
500 TABLET ORAL
Status: COMPLETED | OUTPATIENT
Start: 2025-05-25 | End: 2025-05-25

## 2025-05-25 RX ORDER — ONDANSETRON 4 MG/1
4 TABLET, ORALLY DISINTEGRATING ORAL 3 TIMES DAILY PRN
Qty: 21 TABLET | Refills: 0 | Status: SHIPPED | OUTPATIENT
Start: 2025-05-25

## 2025-05-25 RX ORDER — OMEPRAZOLE 20 MG/1
20 CAPSULE, DELAYED RELEASE ORAL DAILY
COMMUNITY

## 2025-05-25 RX ADMIN — MORPHINE SULFATE 4 MG: 4 INJECTION, SOLUTION INTRAMUSCULAR; INTRAVENOUS at 16:23

## 2025-05-25 RX ADMIN — LEVOFLOXACIN 500 MG: 500 TABLET, FILM COATED ORAL at 17:47

## 2025-05-25 RX ADMIN — IOPAMIDOL 100 ML: 755 INJECTION, SOLUTION INTRAVENOUS at 17:07

## 2025-05-25 RX ADMIN — ONDANSETRON 4 MG: 2 INJECTION, SOLUTION INTRAMUSCULAR; INTRAVENOUS at 16:23

## 2025-05-25 RX ADMIN — METRONIDAZOLE 500 MG: 250 TABLET ORAL at 17:47

## 2025-05-25 ASSESSMENT — PAIN SCALES - GENERAL
PAINLEVEL_OUTOF10: 2
PAINLEVEL_OUTOF10: 3

## 2025-05-25 ASSESSMENT — PAIN DESCRIPTION - PAIN TYPE
TYPE: ACUTE PAIN
TYPE: ACUTE PAIN

## 2025-05-25 ASSESSMENT — PAIN DESCRIPTION - ORIENTATION
ORIENTATION: MID
ORIENTATION: MID

## 2025-05-25 ASSESSMENT — PAIN DESCRIPTION - DESCRIPTORS
DESCRIPTORS: SORE
DESCRIPTORS: SORE

## 2025-05-25 ASSESSMENT — PAIN DESCRIPTION - LOCATION
LOCATION: CHEST
LOCATION: CHEST

## 2025-05-25 ASSESSMENT — PAIN - FUNCTIONAL ASSESSMENT
PAIN_FUNCTIONAL_ASSESSMENT: 0-10
PAIN_FUNCTIONAL_ASSESSMENT: PREVENTS OR INTERFERES SOME ACTIVE ACTIVITIES AND ADLS
PAIN_FUNCTIONAL_ASSESSMENT: PREVENTS OR INTERFERES SOME ACTIVE ACTIVITIES AND ADLS

## 2025-05-25 ASSESSMENT — PAIN DESCRIPTION - FREQUENCY
FREQUENCY: CONTINUOUS
FREQUENCY: CONTINUOUS

## 2025-05-25 ASSESSMENT — LIFESTYLE VARIABLES
HOW OFTEN DO YOU HAVE A DRINK CONTAINING ALCOHOL: MONTHLY OR LESS
HOW MANY STANDARD DRINKS CONTAINING ALCOHOL DO YOU HAVE ON A TYPICAL DAY: 1 OR 2

## 2025-05-25 NOTE — ED PROVIDER NOTES
[facility] [id number]  [phone]      As needed    [facility]  [address]  [facility] [address]  [phone]    If symptoms worsen       DISCHARGE MEDICATIONS:     Medication List        ASK your doctor about these medications      atomoxetine 40 MG capsule  Commonly known as: STRATTERA  Take 1 capsule by mouth daily     cyclobenzaprine 10 MG tablet  Commonly known as: FLEXERIL     metoprolol succinate 100 MG extended release tablet  Commonly known as: TOPROL XL                DISCONTINUED MEDICATIONS:  Current Discharge Medication List          I am the Primary Clinician of Record.   Raegan Graham MD (electronically signed)    (Please note that parts of this dictation were completed with voice recognition software. Quite often unanticipated grammatical, syntax, homophones, and other interpretive errors are inadvertently transcribed by the computer software. Please disregards these errors. Please excuse any errors that have escaped final proofreading.)        Raegan Graham MD  05/26/25 [id number]

## 2025-05-26 ASSESSMENT — HEART SCORE: ECG: NORMAL

## (undated) DEVICE — SOLUTION IRRIG 3000ML LAC R FLX CONT

## (undated) DEVICE — DRAPE,EXTREMITY,89X128,STERILE: Brand: MEDLINE

## (undated) DEVICE — ROCKER SWITCH PENCIL BLADE ELECTRODE, HOLSTER: Brand: EDGE

## (undated) DEVICE — SUTURE MCRYL SZ 3-0 L18IN ABSRB UD L19MM PS-2 3/8 CIR PRIM Y497G

## (undated) DEVICE — SURGICAL PROCEDURE PACK BASIN MAJ SET CUST NO CAUT

## (undated) DEVICE — STERILE POLYISOPRENE POWDER-FREE SURGICAL GLOVES: Brand: PROTEXIS

## (undated) DEVICE — PASSER SUT WIRE STR DISP

## (undated) DEVICE — GOWN,SIRUS,FABRNF,XL,20/CS: Brand: MEDLINE

## (undated) DEVICE — DRAPE,REIN 53X77,STERILE: Brand: MEDLINE

## (undated) DEVICE — TOWEL SURG W17XL27IN STD BLU COT NONFENESTRATED PREWASHED

## (undated) DEVICE — KIT INFECTION CTRL ST FRAN --

## (undated) DEVICE — PADDING CST 4INX4YD --

## (undated) DEVICE — SUTURE VCRL SZ 2-0 L27IN ABSRB VLT L26MM SH 1/2 CIR J317H

## (undated) DEVICE — SUT ETHLN 3-0 18IN PS2 BLK --

## (undated) DEVICE — GAUZE SPONGES,12 PLY: Brand: CURITY

## (undated) DEVICE — (D)PREP SKN CHLRAPRP APPL 26ML -- CONVERT TO ITEM 371833

## (undated) DEVICE — DYONICS 25 INFLOW TUBE SET, 3 PER BOX

## (undated) DEVICE — 4.5 MM INCISOR PLUS STRAIGHT                                    BLADES, POWER/EP-1, VIOLET, PACKAGED                                    6 PER BOX, STERILE

## (undated) DEVICE — DISPOSABLE TOURNIQUET CUFF SINGLE BLADDER, DUAL PORT AND QUICK CONNECT CONNECTOR: Brand: COLOR CUFF

## (undated) DEVICE — KNEE ARTHROSCOPY IV-LF: Brand: MEDLINE INDUSTRIES, INC.

## (undated) DEVICE — LIGHT HANDLE: Brand: DEVON

## (undated) DEVICE — COVER,TABLE,60X90,STERILE: Brand: MEDLINE

## (undated) DEVICE — SUTURE FIBERWIRE SZ 2 W/ TAPERED NEEDLE BLUE L38IN NONABSORB BLU L26.5MM 1/2 CIRCLE AR7200

## (undated) DEVICE — PIN DRL L9.5MM KNEE GUID RG RMR FLIPCUTTER II

## (undated) DEVICE — 4-PORT MANIFOLD: Brand: NEPTUNE 2

## (undated) DEVICE — 3M™ STERI-STRIP™ REINFORCED ADHESIVE SKIN CLOSURES, R1546, 1/4 IN X 4 IN (6 MM X 100 MM), 10 STRIPS/ENVELOPE: Brand: 3M™ STERI-STRIP™

## (undated) DEVICE — REM POLYHESIVE ADULT PATIENT RETURN ELECTRODE: Brand: VALLEYLAB